# Patient Record
Sex: MALE | URBAN - METROPOLITAN AREA
[De-identification: names, ages, dates, MRNs, and addresses within clinical notes are randomized per-mention and may not be internally consistent; named-entity substitution may affect disease eponyms.]

---

## 2021-10-15 ENCOUNTER — APPOINTMENT (OUTPATIENT)
Dept: URBAN - METROPOLITAN AREA CLINIC 259 | Age: 31
Setting detail: DERMATOLOGY
End: 2021-10-19

## 2021-10-15 VITALS — HEIGHT: 73 IN | WEIGHT: 210 LBS

## 2021-10-15 DIAGNOSIS — L29.8 OTHER PRURITUS: ICD-10-CM

## 2021-10-15 DIAGNOSIS — R21 RASH AND OTHER NONSPECIFIC SKIN ERUPTION: ICD-10-CM

## 2021-10-15 PROCEDURE — 99202 OFFICE O/P NEW SF 15 MIN: CPT | Mod: 25

## 2021-10-15 PROCEDURE — 11102 TANGNTL BX SKIN SINGLE LES: CPT

## 2021-10-15 PROCEDURE — OTHER MIPS QUALITY: OTHER

## 2021-10-15 PROCEDURE — OTHER BIOPSY BY SHAVE METHOD: OTHER

## 2021-10-15 PROCEDURE — OTHER COUNSELING: OTHER

## 2021-10-15 ASSESSMENT — LOCATION DETAILED DESCRIPTION DERM
LOCATION DETAILED: LEFT SUPERIOR MEDIAL UPPER BACK
LOCATION DETAILED: STERNUM
LOCATION DETAILED: RIGHT MEDIAL UPPER BACK
LOCATION DETAILED: RIGHT MID-UPPER BACK

## 2021-10-15 ASSESSMENT — LOCATION SIMPLE DESCRIPTION DERM
LOCATION SIMPLE: CHEST
LOCATION SIMPLE: LEFT UPPER BACK
LOCATION SIMPLE: RIGHT UPPER BACK

## 2021-10-15 ASSESSMENT — LOCATION ZONE DERM: LOCATION ZONE: TRUNK

## 2021-10-15 NOTE — PROCEDURE: BIOPSY BY SHAVE METHOD
Hide Second Anesthesia?: No
Silver Nitrate Text: The wound bed was treated with silver nitrate after the biopsy was performed.
Biopsy Type: H and E
Type Of Destruction Used: Curettage
Information: Selecting Yes will display possible errors in your note based on the variables you have selected. This validation is only offered as a suggestion for you. PLEASE NOTE THAT THE VALIDATION TEXT WILL BE REMOVED WHEN YOU FINALIZE YOUR NOTE. IF YOU WANT TO FAX A PRELIMINARY NOTE YOU WILL NEED TO TOGGLE THIS TO 'NO' IF YOU DO NOT WANT IT IN YOUR FAXED NOTE.
Dressing: bandage
Cryotherapy Text: The wound bed was treated with cryotherapy after the biopsy was performed.
Was A Bandage Applied: Yes
Consent: Patient consent was obtained and risks were reviewed including but not limited to scarring, infection, bleeding, scabbing, incomplete removal, nerve damage and allergy to anesthesia.
Anesthesia Type: 1% lidocaine with epinephrine
X Size Of Lesion In Cm: 0
Electrodesiccation And Curettage Text: The wound bed was treated with electrodesiccation and curettage after the biopsy was performed.
Detail Level: Simple
Curettage Text: The wound bed was treated with curettage after the biopsy was performed.
Wound Care: Petrolatum
Depth Of Biopsy: dermis
Billing Type: Third-Party Bill
Hemostasis: Drysol
Electrodesiccation Text: The wound bed was treated with electrodesiccation after the biopsy was performed.
Notification Instructions: Patient will be notified of biopsy results. However, patient instructed to call the office if not contacted within 2 weeks.
Anesthesia Volume In Cc (Will Not Render If 0): 0.5
Biopsy Method: Dermablade
Post-Care Instructions: I reviewed with the patient in detail post-care instructions. Patient is to keep the biopsy site dry overnight, and then apply bacitracin twice daily until healed. Patient may apply hydrogen peroxide soaks to remove any crusting.

## 2021-10-15 NOTE — PROCEDURE: COUNSELING
Detail Level: Generalized
Detail Level: Zone
Patient Specific Counseling (Will Not Stick From Patient To Patient): Briefly discussed both Sidney’s and folliculitis. Patient was given informational handout for Sidney’s.

## 2021-10-15 NOTE — PROCEDURE: MIPS QUALITY
Quality 110: Preventive Care And Screening: Influenza Immunization: Influenza Immunization Ordered or Recommended, but not Administered due to system reason
Quality 130: Documentation Of Current Medications In The Medical Record: Current Medications Documented
Detail Level: Detailed
Quality 431: Preventive Care And Screening: Unhealthy Alcohol Use - Screening: Patient not identified as an unhealthy alcohol user when screened for unhealthy alcohol use using a systematic screening method
Quality 226: Preventive Care And Screening: Tobacco Use: Screening And Cessation Intervention: Patient screened for tobacco use and is an ex/non-smoker

## 2021-10-15 NOTE — HPI: RASH
What Type Of Note Output Would You Prefer (Optional)?: Standard Output
Is The Patient Presenting As Previously Scheduled?: Yes
Is This A New Presentation, Or A Follow-Up?: Rash
Additional History: Patient says he has had this rash intermittently for a few years. Rash is worse with stress, sweat, and in the winter. He says another dermatologist diagnosed the rash as eczema. Topical steroids have been helpful but the rash has never healed.

## 2021-11-01 ENCOUNTER — RX ONLY (RX ONLY)
Age: 31
End: 2021-11-01

## 2021-11-01 RX ORDER — CEFUROXIME AXETIL 250 MG/1
TABLET ORAL
Qty: 60 | Refills: 0 | Status: ERX | COMMUNITY
Start: 2021-11-01

## 2022-02-10 ENCOUNTER — APPOINTMENT (OUTPATIENT)
Dept: URBAN - METROPOLITAN AREA CLINIC 259 | Age: 32
Setting detail: DERMATOLOGY
End: 2022-02-15

## 2022-02-10 DIAGNOSIS — L29.8 OTHER PRURITUS: ICD-10-CM

## 2022-02-10 DIAGNOSIS — L663 OTHER SPECIFIED DISEASES OF HAIR AND HAIR FOLLICLES: ICD-10-CM

## 2022-02-10 DIAGNOSIS — L738 OTHER SPECIFIED DISEASES OF HAIR AND HAIR FOLLICLES: ICD-10-CM

## 2022-02-10 PROBLEM — L02.222 FURUNCLE OF BACK [ANY PART, EXCEPT BUTTOCK]: Status: ACTIVE | Noted: 2022-02-10

## 2022-02-10 PROBLEM — L02.02 FURUNCLE OF FACE: Status: ACTIVE | Noted: 2022-02-10

## 2022-02-10 PROCEDURE — OTHER MIPS QUALITY: OTHER

## 2022-02-10 PROCEDURE — OTHER COUNSELING: OTHER

## 2022-02-10 PROCEDURE — 99213 OFFICE O/P EST LOW 20 MIN: CPT

## 2022-02-10 PROCEDURE — OTHER PRESCRIPTION: OTHER

## 2022-02-10 RX ORDER — TRIAMCINOLONE ACETONIDE 1 MG/G
CREAM TOPICAL
Qty: 454 | Refills: 3 | Status: ERX | COMMUNITY
Start: 2022-02-10

## 2022-02-10 RX ORDER — KETOCONAZOLE 20 MG/ML
SHAMPOO, SUSPENSION TOPICAL
Qty: 1 | Refills: 4 | Status: ERX | COMMUNITY
Start: 2022-02-10

## 2022-02-10 RX ORDER — DOXYCYCLINE HYCLATE 50 MG/1
CAPSULE, GELATIN COATED ORAL
Qty: 30 | Refills: 4 | Status: ERX | COMMUNITY
Start: 2022-02-10

## 2022-02-10 ASSESSMENT — LOCATION DETAILED DESCRIPTION DERM
LOCATION DETAILED: SUPERIOR THORACIC SPINE
LOCATION DETAILED: LEFT INFERIOR CENTRAL MALAR CHEEK
LOCATION DETAILED: LEFT SUPERIOR MEDIAL UPPER BACK

## 2022-02-10 ASSESSMENT — LOCATION ZONE DERM
LOCATION ZONE: FACE
LOCATION ZONE: TRUNK

## 2022-02-10 ASSESSMENT — LOCATION SIMPLE DESCRIPTION DERM
LOCATION SIMPLE: LEFT CHEEK
LOCATION SIMPLE: LEFT UPPER BACK
LOCATION SIMPLE: UPPER BACK

## 2022-12-29 ENCOUNTER — APPOINTMENT (OUTPATIENT)
Dept: URBAN - METROPOLITAN AREA CLINIC 259 | Age: 32
Setting detail: DERMATOLOGY
End: 2023-01-01

## 2022-12-29 DIAGNOSIS — L738 OTHER SPECIFIED DISEASES OF HAIR AND HAIR FOLLICLES: ICD-10-CM

## 2022-12-29 DIAGNOSIS — L663 OTHER SPECIFIED DISEASES OF HAIR AND HAIR FOLLICLES: ICD-10-CM

## 2022-12-29 DIAGNOSIS — L20.89 OTHER ATOPIC DERMATITIS: ICD-10-CM

## 2022-12-29 PROBLEM — L20.84 INTRINSIC (ALLERGIC) ECZEMA: Status: ACTIVE | Noted: 2022-12-29

## 2022-12-29 PROBLEM — L02.222 FURUNCLE OF BACK [ANY PART, EXCEPT BUTTOCK]: Status: ACTIVE | Noted: 2022-12-29

## 2022-12-29 PROCEDURE — OTHER PRESCRIPTION: OTHER

## 2022-12-29 PROCEDURE — OTHER PRESCRIPTION MEDICATION MANAGEMENT: OTHER

## 2022-12-29 PROCEDURE — OTHER MIPS QUALITY: OTHER

## 2022-12-29 PROCEDURE — OTHER COUNSELING: OTHER

## 2022-12-29 PROCEDURE — 99213 OFFICE O/P EST LOW 20 MIN: CPT

## 2022-12-29 RX ORDER — ERYTHROMYCIN 20 MG/G
GEL TOPICAL
Qty: 60 | Refills: 2 | Status: ERX | COMMUNITY
Start: 2022-12-29

## 2022-12-29 RX ORDER — SULFAMETHOXAZOLE AND TRIMETHOPRIM 800; 160 MG/1; MG/1
TABLET ORAL
Qty: 120 | Refills: 0 | Status: ERX | COMMUNITY
Start: 2022-12-29

## 2022-12-29 RX ORDER — SODIUM SULFACETAMIDE AND SULFUR 80; 40 MG/ML; MG/ML
LOTION TOPICAL
Qty: 473 | Refills: 3 | Status: ERX | COMMUNITY
Start: 2022-12-29

## 2022-12-29 RX ORDER — TRIAMCINOLONE ACETONIDE 1 MG/G
CREAM TOPICAL BID
Qty: 454 | Refills: 3 | Status: ERX

## 2022-12-29 ASSESSMENT — LOCATION DETAILED DESCRIPTION DERM
LOCATION DETAILED: RIGHT MEDIAL UPPER BACK
LOCATION DETAILED: INFERIOR THORACIC SPINE

## 2022-12-29 ASSESSMENT — LOCATION SIMPLE DESCRIPTION DERM
LOCATION SIMPLE: RIGHT UPPER BACK
LOCATION SIMPLE: UPPER BACK

## 2022-12-29 ASSESSMENT — LOCATION ZONE DERM: LOCATION ZONE: TRUNK

## 2022-12-29 NOTE — PROCEDURE: PRESCRIPTION MEDICATION MANAGEMENT
Render In Strict Bullet Format?: No
Detail Level: Zone
Initiate Treatment: Bactrim DS BID\\nErythromycin gel QD\\nSulfacleanse QD
Plan: Discussed accutane with the Pt. The pt would like to try all the alternative options before initiating it. He will RTC in 2 months to reevaluate.

## 2022-12-30 RX ORDER — SODIUM SULFACETAMIDE AND SULFUR 80; 40 MG/ML; MG/ML
LOTION TOPICAL
Qty: 473 | Refills: 3 | Status: ERX

## 2023-01-11 ENCOUNTER — APPOINTMENT (OUTPATIENT)
Dept: CT IMAGING | Facility: CLINIC | Age: 33
End: 2023-01-11
Attending: EMERGENCY MEDICINE
Payer: COMMERCIAL

## 2023-01-11 ENCOUNTER — HOSPITAL ENCOUNTER (INPATIENT)
Facility: CLINIC | Age: 33
LOS: 2 days | Discharge: HOME | End: 2023-01-13
Attending: EMERGENCY MEDICINE | Admitting: INTERNAL MEDICINE
Payer: COMMERCIAL

## 2023-01-11 DIAGNOSIS — L30.9 DERMATITIS: Primary | ICD-10-CM

## 2023-01-11 DIAGNOSIS — K56.609 SMALL BOWEL OBSTRUCTION (H): ICD-10-CM

## 2023-01-11 LAB
ALBUMIN SERPL-MCNC: 4.5 G/DL (ref 3.4–5)
ALBUMIN UR-MCNC: NEGATIVE MG/DL
ALP SERPL-CCNC: 52 U/L (ref 40–150)
ALT SERPL W P-5'-P-CCNC: 47 U/L (ref 0–70)
ANION GAP SERPL CALCULATED.3IONS-SCNC: 10 MMOL/L (ref 3–14)
APPEARANCE UR: CLEAR
AST SERPL W P-5'-P-CCNC: 24 U/L (ref 0–45)
BASOPHILS # BLD AUTO: 0 10E3/UL (ref 0–0.2)
BASOPHILS NFR BLD AUTO: 0 %
BILIRUB SERPL-MCNC: 1.4 MG/DL (ref 0.2–1.3)
BILIRUB UR QL STRIP: NEGATIVE
BUN SERPL-MCNC: 17 MG/DL (ref 7–30)
CALCIUM SERPL-MCNC: 9.8 MG/DL (ref 8.5–10.1)
CHLORIDE BLD-SCNC: 101 MMOL/L (ref 94–109)
CO2 SERPL-SCNC: 24 MMOL/L (ref 20–32)
COLOR UR AUTO: ABNORMAL
CREAT SERPL-MCNC: 1.21 MG/DL (ref 0.66–1.25)
EOSINOPHIL # BLD AUTO: 0.1 10E3/UL (ref 0–0.7)
EOSINOPHIL NFR BLD AUTO: 1 %
ERYTHROCYTE [DISTWIDTH] IN BLOOD BY AUTOMATED COUNT: 11.8 % (ref 10–15)
GFR SERPL CREATININE-BSD FRML MDRD: 81 ML/MIN/1.73M2
GLUCOSE BLD-MCNC: 145 MG/DL (ref 70–99)
GLUCOSE UR STRIP-MCNC: NEGATIVE MG/DL
HCT VFR BLD AUTO: 44.8 % (ref 40–53)
HGB BLD-MCNC: 15.1 G/DL (ref 13.3–17.7)
HGB UR QL STRIP: NEGATIVE
IMM GRANULOCYTES # BLD: 0 10E3/UL
IMM GRANULOCYTES NFR BLD: 0 %
KETONES UR STRIP-MCNC: 40 MG/DL
LEUKOCYTE ESTERASE UR QL STRIP: NEGATIVE
LIPASE SERPL-CCNC: 168 U/L (ref 73–393)
LYMPHOCYTES # BLD AUTO: 1.5 10E3/UL (ref 0.8–5.3)
LYMPHOCYTES NFR BLD AUTO: 14 %
MCH RBC QN AUTO: 31.7 PG (ref 26.5–33)
MCHC RBC AUTO-ENTMCNC: 33.7 G/DL (ref 31.5–36.5)
MCV RBC AUTO: 94 FL (ref 78–100)
MONOCYTES # BLD AUTO: 0.2 10E3/UL (ref 0–1.3)
MONOCYTES NFR BLD AUTO: 2 %
MUCOUS THREADS #/AREA URNS LPF: PRESENT /LPF
NEUTROPHILS # BLD AUTO: 8.4 10E3/UL (ref 1.6–8.3)
NEUTROPHILS NFR BLD AUTO: 83 %
NITRATE UR QL: NEGATIVE
NRBC # BLD AUTO: 0 10E3/UL
NRBC BLD AUTO-RTO: 0 /100
PH UR STRIP: 6 [PH] (ref 5–7)
PLATELET # BLD AUTO: 220 10E3/UL (ref 150–450)
POTASSIUM BLD-SCNC: 4.7 MMOL/L (ref 3.4–5.3)
PROT SERPL-MCNC: 8 G/DL (ref 6.8–8.8)
RBC # BLD AUTO: 4.77 10E6/UL (ref 4.4–5.9)
RBC URINE: <1 /HPF
SODIUM SERPL-SCNC: 135 MMOL/L (ref 133–144)
SP GR UR STRIP: 1.03 (ref 1–1.03)
UROBILINOGEN UR STRIP-MCNC: NORMAL MG/DL
WBC # BLD AUTO: 10.2 10E3/UL (ref 4–11)
WBC URINE: <1 /HPF

## 2023-01-11 PROCEDURE — 99222 1ST HOSP IP/OBS MODERATE 55: CPT | Mod: AI | Performed by: INTERNAL MEDICINE

## 2023-01-11 PROCEDURE — 258N000003 HC RX IP 258 OP 636: Performed by: EMERGENCY MEDICINE

## 2023-01-11 PROCEDURE — 250N000011 HC RX IP 250 OP 636: Performed by: EMERGENCY MEDICINE

## 2023-01-11 PROCEDURE — 96375 TX/PRO/DX INJ NEW DRUG ADDON: CPT

## 2023-01-11 PROCEDURE — 120N000001 HC R&B MED SURG/OB

## 2023-01-11 PROCEDURE — 99222 1ST HOSP IP/OBS MODERATE 55: CPT | Performed by: SURGERY

## 2023-01-11 PROCEDURE — 96376 TX/PRO/DX INJ SAME DRUG ADON: CPT

## 2023-01-11 PROCEDURE — 96361 HYDRATE IV INFUSION ADD-ON: CPT

## 2023-01-11 PROCEDURE — 81001 URINALYSIS AUTO W/SCOPE: CPT | Performed by: EMERGENCY MEDICINE

## 2023-01-11 PROCEDURE — 36415 COLL VENOUS BLD VENIPUNCTURE: CPT | Performed by: EMERGENCY MEDICINE

## 2023-01-11 PROCEDURE — 80053 COMPREHEN METABOLIC PANEL: CPT | Performed by: EMERGENCY MEDICINE

## 2023-01-11 PROCEDURE — 74177 CT ABD & PELVIS W/CONTRAST: CPT

## 2023-01-11 PROCEDURE — 83690 ASSAY OF LIPASE: CPT | Performed by: EMERGENCY MEDICINE

## 2023-01-11 PROCEDURE — 250N000013 HC RX MED GY IP 250 OP 250 PS 637: Performed by: EMERGENCY MEDICINE

## 2023-01-11 PROCEDURE — 96374 THER/PROPH/DIAG INJ IV PUSH: CPT

## 2023-01-11 PROCEDURE — 85025 COMPLETE CBC W/AUTO DIFF WBC: CPT | Performed by: EMERGENCY MEDICINE

## 2023-01-11 PROCEDURE — 250N000009 HC RX 250: Performed by: EMERGENCY MEDICINE

## 2023-01-11 PROCEDURE — 258N000003 HC RX IP 258 OP 636: Performed by: INTERNAL MEDICINE

## 2023-01-11 PROCEDURE — 99285 EMERGENCY DEPT VISIT HI MDM: CPT | Mod: 25

## 2023-01-11 RX ORDER — IOPAMIDOL 755 MG/ML
101 INJECTION, SOLUTION INTRAVASCULAR ONCE
Status: COMPLETED | OUTPATIENT
Start: 2023-01-11 | End: 2023-01-11

## 2023-01-11 RX ORDER — SULFAMETHOXAZOLE/TRIMETHOPRIM 800-160 MG
1 TABLET ORAL 2 TIMES DAILY WITH MEALS
Status: ON HOLD | COMMUNITY
Start: 2022-12-29 | End: 2023-01-13

## 2023-01-11 RX ORDER — MORPHINE SULFATE 4 MG/ML
4 INJECTION, SOLUTION INTRAMUSCULAR; INTRAVENOUS ONCE
Status: COMPLETED | OUTPATIENT
Start: 2023-01-11 | End: 2023-01-11

## 2023-01-11 RX ORDER — ACETAMINOPHEN 325 MG/1
650 TABLET ORAL EVERY 6 HOURS PRN
Status: DISCONTINUED | OUTPATIENT
Start: 2023-01-11 | End: 2023-01-13 | Stop reason: HOSPADM

## 2023-01-11 RX ORDER — ERYTHROMYCIN 20 MG/G
1 GEL TOPICAL 2 TIMES DAILY
COMMUNITY
Start: 2022-12-29 | End: 2023-01-19

## 2023-01-11 RX ORDER — ACETAMINOPHEN 650 MG/1
650 SUPPOSITORY RECTAL EVERY 6 HOURS PRN
Status: DISCONTINUED | OUTPATIENT
Start: 2023-01-11 | End: 2023-01-13 | Stop reason: HOSPADM

## 2023-01-11 RX ORDER — SODIUM CHLORIDE 9 MG/ML
INJECTION, SOLUTION INTRAVENOUS CONTINUOUS
Status: DISCONTINUED | OUTPATIENT
Start: 2023-01-11 | End: 2023-01-11

## 2023-01-11 RX ORDER — TRIAMCINOLONE ACETONIDE 1 MG/G
CREAM TOPICAL
COMMUNITY
Start: 2022-12-29 | End: 2023-01-19

## 2023-01-11 RX ORDER — ONDANSETRON 2 MG/ML
4 INJECTION INTRAMUSCULAR; INTRAVENOUS EVERY 30 MIN PRN
Status: DISCONTINUED | OUTPATIENT
Start: 2023-01-11 | End: 2023-01-11

## 2023-01-11 RX ORDER — ONDANSETRON 2 MG/ML
4 INJECTION INTRAMUSCULAR; INTRAVENOUS EVERY 6 HOURS PRN
Status: DISCONTINUED | OUTPATIENT
Start: 2023-01-11 | End: 2023-01-13 | Stop reason: HOSPADM

## 2023-01-11 RX ORDER — DIPHENHYDRAMINE HYDROCHLORIDE 50 MG/ML
25 INJECTION INTRAMUSCULAR; INTRAVENOUS ONCE
Status: COMPLETED | OUTPATIENT
Start: 2023-01-11 | End: 2023-01-11

## 2023-01-11 RX ORDER — PROCHLORPERAZINE MALEATE 5 MG
10 TABLET ORAL EVERY 6 HOURS PRN
Status: DISCONTINUED | OUTPATIENT
Start: 2023-01-11 | End: 2023-01-13 | Stop reason: HOSPADM

## 2023-01-11 RX ORDER — ONDANSETRON 4 MG/1
4 TABLET, ORALLY DISINTEGRATING ORAL EVERY 6 HOURS PRN
Status: DISCONTINUED | OUTPATIENT
Start: 2023-01-11 | End: 2023-01-13 | Stop reason: HOSPADM

## 2023-01-11 RX ORDER — ERYTHROMYCIN 20 MG/G
1 GEL TOPICAL 2 TIMES DAILY
Status: DISCONTINUED | OUTPATIENT
Start: 2023-01-11 | End: 2023-01-13 | Stop reason: HOSPADM

## 2023-01-11 RX ORDER — POLYETHYLENE GLYCOL 3350 17 G/17G
17 POWDER, FOR SOLUTION ORAL DAILY PRN
Status: DISCONTINUED | OUTPATIENT
Start: 2023-01-11 | End: 2023-01-13 | Stop reason: HOSPADM

## 2023-01-11 RX ORDER — PROCHLORPERAZINE 25 MG
25 SUPPOSITORY, RECTAL RECTAL EVERY 12 HOURS PRN
Status: DISCONTINUED | OUTPATIENT
Start: 2023-01-11 | End: 2023-01-13 | Stop reason: HOSPADM

## 2023-01-11 RX ORDER — LIDOCAINE 40 MG/G
CREAM TOPICAL
Status: DISCONTINUED | OUTPATIENT
Start: 2023-01-11 | End: 2023-01-13 | Stop reason: HOSPADM

## 2023-01-11 RX ORDER — SODIUM CHLORIDE, SODIUM LACTATE, POTASSIUM CHLORIDE, CALCIUM CHLORIDE 600; 310; 30; 20 MG/100ML; MG/100ML; MG/100ML; MG/100ML
INJECTION, SOLUTION INTRAVENOUS CONTINUOUS
Status: DISCONTINUED | OUTPATIENT
Start: 2023-01-11 | End: 2023-01-13 | Stop reason: HOSPADM

## 2023-01-11 RX ORDER — BISACODYL 10 MG
10 SUPPOSITORY, RECTAL RECTAL DAILY PRN
Status: DISCONTINUED | OUTPATIENT
Start: 2023-01-11 | End: 2023-01-13 | Stop reason: HOSPADM

## 2023-01-11 RX ORDER — METOCLOPRAMIDE HYDROCHLORIDE 5 MG/ML
10 INJECTION INTRAMUSCULAR; INTRAVENOUS ONCE
Status: COMPLETED | OUTPATIENT
Start: 2023-01-11 | End: 2023-01-11

## 2023-01-11 RX ADMIN — MORPHINE SULFATE 4 MG: 4 INJECTION, SOLUTION INTRAMUSCULAR; INTRAVENOUS at 08:23

## 2023-01-11 RX ADMIN — SODIUM CHLORIDE, POTASSIUM CHLORIDE, SODIUM LACTATE AND CALCIUM CHLORIDE: 600; 310; 30; 20 INJECTION, SOLUTION INTRAVENOUS at 14:12

## 2023-01-11 RX ADMIN — SODIUM CHLORIDE 1000 ML: 9 INJECTION, SOLUTION INTRAVENOUS at 08:28

## 2023-01-11 RX ADMIN — SODIUM CHLORIDE 1000 ML: 9 INJECTION, SOLUTION INTRAVENOUS at 05:45

## 2023-01-11 RX ADMIN — METOCLOPRAMIDE 10 MG: 5 INJECTION, SOLUTION INTRAMUSCULAR; INTRAVENOUS at 06:26

## 2023-01-11 RX ADMIN — ONDANSETRON 4 MG: 2 INJECTION INTRAMUSCULAR; INTRAVENOUS at 08:23

## 2023-01-11 RX ADMIN — DIPHENHYDRAMINE HYDROCHLORIDE 25 MG: 50 INJECTION, SOLUTION INTRAMUSCULAR; INTRAVENOUS at 06:26

## 2023-01-11 RX ADMIN — IOPAMIDOL 101 ML: 755 INJECTION, SOLUTION INTRAVENOUS at 06:52

## 2023-01-11 RX ADMIN — ALUMINUM HYDROXIDE, MAGNESIUM HYDROXIDE, AND SIMETHICONE 30 ML: 200; 200; 20 SUSPENSION ORAL at 06:26

## 2023-01-11 RX ADMIN — SODIUM CHLORIDE 69 ML: 900 INJECTION INTRAVENOUS at 06:52

## 2023-01-11 RX ADMIN — FAMOTIDINE 20 MG: 10 INJECTION INTRAVENOUS at 06:25

## 2023-01-11 RX ADMIN — ONDANSETRON 4 MG: 2 INJECTION INTRAMUSCULAR; INTRAVENOUS at 05:42

## 2023-01-11 ASSESSMENT — ACTIVITIES OF DAILY LIVING (ADL)
ADLS_ACUITY_SCORE: 35

## 2023-01-11 ASSESSMENT — ENCOUNTER SYMPTOMS
DYSURIA: 0
HEMATURIA: 0
BACK PAIN: 0
FEVER: 0
COUGH: 0
VOMITING: 1
NAUSEA: 1
FLANK PAIN: 0
DIARRHEA: 0
CONSTIPATION: 0
ABDOMINAL PAIN: 1

## 2023-01-11 NOTE — ED TRIAGE NOTES
9pm last night began to have middle abdominal pain with vomiting     Triage Assessment     Row Name 01/11/23 0527       Triage Assessment (Adult)    Airway WDL WDL       Respiratory WDL    Respiratory WDL WDL       Skin Circulation/Temperature WDL    Skin Circulation/Temperature WDL WDL       Cardiac WDL    Cardiac WDL WDL       Peripheral/Neurovascular WDL    Peripheral Neurovascular WDL WDL       Cognitive/Neuro/Behavioral WDL    Cognitive/Neuro/Behavioral WDL WDL

## 2023-01-11 NOTE — ED TRIAGE NOTES
Triage Assessment     Row Name 01/11/23 0527       Triage Assessment (Adult)    Airway WDL WDL       Respiratory WDL    Respiratory WDL WDL       Skin Circulation/Temperature WDL    Skin Circulation/Temperature WDL WDL       Cardiac WDL    Cardiac WDL WDL       Peripheral/Neurovascular WDL    Peripheral Neurovascular WDL WDL       Cognitive/Neuro/Behavioral WDL    Cognitive/Neuro/Behavioral WDL WDL

## 2023-01-11 NOTE — H&P
"[facility]    History and Physical - Hospitalist Service       Date of Admission:  1/11/2023    Assessment & Plan   Montrell Jaramillo is a 32 year-old male with no significant past medical history who presents with nausea, vomiting and abdominal pain. Admitted on 1/11/2023.    Small bowel obstruction  *Presented with nausea, vomiting, abdominal pain.   *CT abd/pelvis with multiple dilated mid to distal small bowel loops leading to decompression at RLQ, compatible with SBO.   *No prior history of abdominal surgeries in the past. Abdominal exam non-surgical at this time.   - NPO  - NG deferred for now as not actively vomiting, place if recurrent vomiting. Discussed with patient  - IV fluids  - Monitor and replace electrolytes  - General surgery consulted for co-management   - IV analgesics, anti-emetics    Cystic acne, back  *Recently prescribed 1 month of TMP-SMX, erythromycin gel, triamcinolone by dermatology.  *Reports significantly improved and had some stomach upset with TMP-SMX  - Hold TMP-SMX  - Continue erythromycin gel, can use home supply        Diet:   NPO  DVT Prophylaxis: Pneumatic Compression Devices  Morris Catheter: Not present  Code Status:   Full code    Disposition Plan   Admit to inpatient. Anticipate greater than or equal to 2 midnights prior to discharge.     Entered: Jameel Marcum MD 01/11/2023, 8:19 AM     The patient's care was discussed with the ED provider, patient       Clinically Significant Risk Factors Present on Admission                       # Overweight: Estimated body mass index is 26.39 kg/m  as calculated from the following:    Height as of this encounter: 1.854 m (6' 1\").    Weight as of this encounter: 90.7 kg (200 lb).                Jameel Marcum MD  [facility]    ______________________________________________________________________    Chief Complaint   Nausea, vomiting, abdominal pain     History of Present Illness "   Montrell Jaramillo is a 32 year old male who presents with the above chief complaint.    History is obtained from the patient. The patient reports around 9 PM the night prior to admission he developed sharp, cramping abdominal pain that occurred as a band across his mid to lower abdomen. He felt nauseated and had multiple episodes of vomiting, last occurring at around 4:30 AM this morning.  He last passed gas and had a bowel movement yesterday afternoon before symptom onset.  He reports while he was in college he had a similar episode with negative work-up.  He denies any prior abdominal surgeries.  He was recently prescribed TMP-SMX, steroid cream and abx cream for cystic acne/rash on his back. He reports some stomach upset with TMP-SMX and overall his condition has substantially improved. He denies any abscess or anything requiring drainage. Otherwise denies any chronic medical issues.    In the Emergency Department, the patient was seen by Dr. Wilson, with whom I discussed the patient's presenting symptoms and emergency department course.  Initial vital signs were a temperature of 98.5F, HR 89, /72, RR 20, SpO2 99% on room air. Pertinent work-up as noted in A&P. The patient received IV fluids, anti-emetics and Hospitalists were contacted for admission to the hospital.     Review of Systems    Complete 10 point review of systems assessed and negative except as noted in HPI.    Past Medical History    I have reviewed this patient's medical history and updated it with pertinent information if needed.   No past medical history on file.    Past Surgical History   I have reviewed this patient's surgical history and updated it with pertinent information if needed.  No past surgical history on file.      Social History   I have reviewed this patient's social history and updated it with pertinent information if needed.  Non smoker. Reports alcohol use on the weekends. No illicit or IV drug use    Lives in  [place]    Family History   I have reviewed this patient's family history and updated it with pertinent information if needed.   Family History   Problem Relation Age of Onset     Heart Disease Father         Prior to Admission Medications   Prior to Admission Medications   Prescriptions Last Dose Informant Patient Reported? Taking?   erythromycin with ethanol (EMGEL) 2 % gel 1/10/2023  Yes Yes   Sig: Apply 1 applicator topically 2 times daily Affected area on back   sulfamethoxazole-trimethoprim (BACTRIM DS) 800-160 MG tablet 1/10/2023  Yes Yes   Sig: Take 1 tablet by mouth 2 times daily (with meals) Prescribed 12/29/22 for 30 days   triamcinolone (KENALOG) 0.1 % external cream 1/10/2023  Yes Yes   Sig: APPLY TWICE DAILY TO THE AFFECTED AREA ON BACK FOR UP TO 2 WEEKS AT A TIME. TAKE A 2 WEEK BREAK AND REPEAT AS NEEDED      Facility-Administered Medications: None     Allergies   Allergies   Allergen Reactions     Penicillins Rash       Physical Exam   Vital Signs: Temp: 98.5  F (36.9  C)   BP: 127/72 (Simultaneous filing. User may not have seen previous data.) Pulse: 89 (Simultaneous filing. User may not have seen previous data.)   Resp: 20 SpO2: 99 % (Simultaneous filing. User may not have seen previous data.) O2 Device: None (Room air)    Weight: 200 lbs 0 oz    Constitutional: Well-appearing, NAD  Eyes: PERRL, EOMI  HENT: Oropharynx clear, MMM  Respiratory: Clear to auscultation bilaterally, good air movement, normal effort   Cardiovascular: RRR, no m/r/g. No peripheral edema.   GI: Soft, mild diffuse tenderness, non-distended. No rebound tenderness or guarding.   Skin: Warm, dry. Cystic acne on back.  Neurologic: Alert. Responding to questions appropriately. Following commands.    Psychiatric: Normal affect, appropriate      Data   Data reviewed today: I reviewed all medications, new labs and imaging results over the last 24 hours. I personally reviewed no images or EKG's today.    Recent Labs   Lab  01/11/23  0538   WBC 10.2   HGB 15.1   MCV 94         POTASSIUM 4.7   CHLORIDE 101   CO2 24   BUN 17   CR 1.21   ANIONGAP 10   LINDA 9.8   *   ALBUMIN 4.5   PROTTOTAL 8.0   BILITOTAL 1.4*   ALKPHOS 52   ALT 47   AST 24   LIPASE 168       Recent Results (from the past 24 hour(s))   CT Abdomen Pelvis w Contrast    Narrative    CT ABDOMEN/PELVIS WITH CONTRAST January 11, 2023 7:01 AM    CLINICAL HISTORY: Epigastric and left lower quadrant pain.    TECHNIQUE: CT scan of the abdomen and pelvis was performed following  injection of IV contrast. Multiplanar reformats were obtained. Dose  reduction techniques were used.  CONTRAST: 101mL Isovue-370.    COMPARISON: None.    FINDINGS:   LOWER CHEST: Normal.    HEPATOBILIARY: Normal.    PANCREAS: Normal.    SPLEEN: Normal.    ADRENAL GLANDS: Normal.    KIDNEYS/BLADDER: No significant mass, stones, or hydronephrosis. There  are simple or benign cysts. No follow up is needed.    BOWEL: Multiple dilated mid to distal small bowel loops leading to  fecalized small bowel at the pelvis level. Transition to decompression  at the distal ileum at the right lower quadrant. No convincing  evidence for appendicitis. Limited view of the appendix. No free air  at this time. Decompressed colon.    PELVIC ORGANS: Small pelvic fluid noted.    ADDITIONAL FINDINGS: None.    MUSCULOSKELETAL: Normal.      Impression    IMPRESSION: Multiple dilated mid to distal small bowel loops leading  to decompression at the right lower quadrant. Findings compatible with  small bowel obstruction. There is small free pelvic fluid. No free air  is seen.

## 2023-01-11 NOTE — CONSULTS
"[facility] General Surgery Consultation    Montrell Jaramillo MRN# [id number]   YOB: 1990 Age: 32 year old      Date of Admission:  1/11/2023  Date of Consult: 1/11/2023         Assessment and Plan:   Patient is a 32 year old male with no significant past medical history who presents with his second small bowel obstruction. He has no medical or surgical history suggestive of any underlying cause. His abdominal exam is reassuring against ischemia or perforation, currently without any signs of peritonitis - no indication for emergent intervention.     PLAN:  NPO  Would place NG if vomiting  We will discuss and consider possibility of diagnostic laparoscopy to investigate underlying cause, possibly tomorrow           Requesting Physician:      Mendy Wilson MD          Chief Complaint:     Chief Complaint   Patient presents with     Abdominal Pain     Vomiting          History of Present Illness:   Montrell Jaramillo is a 32 year old male who presented to the ED this morning with epigastric abdominal pain that was onset last evening around 9 PM. He reports the pain is severe and was associated with significant nausea and vomiting. He has not stooled or passed gas since yesterday, before the pain started. The pain is largely central. He came to the ED because vomiting did not relieve the pain.    Approximately 12 years ago when he was in college, he had a similar episode. Work-up was negative for underlying cause (barium swallow and CT). His pain at that time was primarily in his left flank.     He denies any personal or family history of inflammatory bowel disease. He has an uncle with colon cancer. He has never had a colonoscopy.           Physical Exam:   Blood pressure 127/72, pulse 89, temperature 98.5  F (36.9  C), resp. rate 20, height 1.854 m (6' 1\"), weight 90.7 kg (200 lb), SpO2 99 %.  200 lbs 0 oz  General: Curled up in bed, appears uncomfortably.   Psych: Alert and Oriented.  " Normal affect  Neurological: grossly intact  Eyes: Sclera clear  Respiratory:  nonlabored breathing  Cardiovascular:  RRR  GI: Abdomen soft, mildly distended. Tender to palpation especially in epigastric/periumbilical region. No surgical scars.    Lymphatic/Hematologic/Immune:  No femoral or cervical lymphadenopathy.  Integumentary:  No rashes         Past Medical History:   Cystic acne         Past Surgical History:   None         Current Medications:           ondansetron         Home Medications:     Prior to Admission medications    Medication Sig Last Dose Taking? Auth Provider Long Term End Date   erythromycin with ethanol (EMGEL) 2 % gel Apply 1 applicator topically 2 times daily Affected area on back 1/10/2023 Yes Unknown, Entered By History     sulfamethoxazole-trimethoprim (BACTRIM DS) 800-160 MG tablet Take 1 tablet by mouth 2 times daily (with meals) Prescribed 12/29/22 for 30 days 1/10/2023 Yes Unknown, Entered By History     triamcinolone (KENALOG) 0.1 % external cream APPLY TWICE DAILY TO THE AFFECTED AREA ON BACK FOR UP TO 2 WEEKS AT A TIME. TAKE A 2 WEEK BREAK AND REPEAT AS NEEDED 1/10/2023 Yes Unknown, Entered By History              Allergies:     Allergies   Allergen Reactions     Penicillins Rash            Family History:     Family History   Problem Relation Age of Onset     Heart Disease Father    Colon cancer  Uncle        Social History:   Montrell Jaramillo  reports that he has never smoked. He does not have any smokeless tobacco history on file. He reports current alcohol use.          Review of Systems:   The 10 point Review of Systems is negative other than noted in the HPI.         Labs/Imaging   All new lab and imaging data was reviewed.   CT A/P:  Multiple dilated mid to distal small bowel loops leading  to decompression at the right lower quadrant. Findings compatible with  small bowel obstruction. There is small free pelvic fluid. No free air  is seen.        Kendal Fernandes MD

## 2023-01-11 NOTE — ED PROVIDER NOTES
"  History     Chief Complaint:  Abdominal Pain and Vomiting     The history is provided by the patient.      Montrell Jaramillo is a healthy 32 year old male who presents with abdominal pain and vomiting. Last night, about 8.5 hours prior to arrival, he gradually developed epigastric abdominal pain described as stabbing and as if \"something is blocked\". He has had numerous episodes of non-bloody emesis. He had a normal bowel movement prior to onset of pain yesterday. He has no dysuria or hematuria citing decreased urine output he attributes to dehydration. He denies fever, cough, or flank pain. He has not tried any medications for pain. He denies overuse of marijuana or alcohol.    Notably, he had a similar episode of pain and vomiting in 2012 in [place] without definitive diagnosis.    Independent Historian: Yes, as noted above.      Review of External Notes: I reviewed the ED visit from 4/20/2012     ROS:  Review of Systems   Constitutional: Negative for fever.   Respiratory: Negative for cough.    Gastrointestinal: Positive for abdominal pain, nausea and vomiting. Negative for constipation and diarrhea.   Genitourinary: Positive for decreased urine volume. Negative for dysuria, flank pain and hematuria.   Musculoskeletal: Negative for back pain.   All other systems reviewed and are negative.    Allergies:  Penicillins     Medications:    Erythromycin gel  Bactrim (acne)  Kenalog cream    Past Medical History:    The patient denies past medical history.     Social History:  Patient is escorted by a friend.   He denies cigarette use but reports occasional marijuana and alcohol use.     Physical Exam     Patient Vitals for the past 24 hrs:   BP Temp Pulse Resp SpO2 Height Weight   01/11/23 0528 -- -- -- -- -- 1.854 m (6' 1\") 90.7 kg (200 lb)   01/11/23 0527 127/72 98.5  F (36.9  C) 89 20 99 % -- --        Physical Exam  General: Well-developed and well-nourished. Uncomfortable appearing young  man. " Cooperative.  Head:  Atraumatic.  Eyes:  Conjunctivae, lids, and sclerae are normal.  Neck:  Supple. Normal range of motion.  CV:  Regular rate and rhythm. Normal heart sounds with no murmurs, rubs, or gallops detected.  Resp:  No respiratory distress. Clear to auscultation bilaterally without decreased breath sounds, wheezing, rales, or rhonchi.  GI:  Soft. Non-distended.  Epigastric and left abdominal tenderness to palpation.   MS:  Normal ROM.   Skin:  Warm. Non-diaphoretic. No pallor.  Neuro:  Awake. A&Ox3. Normal strength.  Psych: Normal mood and affect. Normal speech.  Vitals reviewed.    Emergency Department Course     Imaging:  CT Abdomen Pelvis w Contrast   Preliminary Result   IMPRESSION: Multiple dilated mid to distal small bowel loops leading   to decompression at the right lower quadrant. Findings compatible with   small bowel obstruction. There is small free pelvic fluid. No free air   is seen.         Report per radiology    Laboratory:  Labs Ordered and Resulted from Time of ED Arrival to Time of ED Departure   COMPREHENSIVE METABOLIC PANEL - Abnormal       Result Value    Sodium 135      Potassium 4.7      Chloride 101      Carbon Dioxide (CO2) 24      Anion Gap 10      Urea Nitrogen 17      Creatinine 1.21      Calcium 9.8      Glucose 145 (*)     Alkaline Phosphatase 52      AST 24      ALT 47      Protein Total 8.0      Albumin 4.5      Bilirubin Total 1.4 (*)     GFR Estimate 81     CBC WITH PLATELETS AND DIFFERENTIAL - Abnormal    WBC Count 10.2      RBC Count 4.77      Hemoglobin 15.1      Hematocrit 44.8      MCV 94      MCH 31.7      MCHC 33.7      RDW 11.8      Platelet Count 220      % Neutrophils 83      % Lymphocytes 14      % Monocytes 2      % Eosinophils 1      % Basophils 0      % Immature Granulocytes 0      NRBCs per 100 WBC 0      Absolute Neutrophils 8.4 (*)     Absolute Lymphocytes 1.5      Absolute Monocytes 0.2      Absolute Eosinophils 0.1      Absolute Basophils 0.0       Absolute Immature Granulocytes 0.0      Absolute NRBCs 0.0     ROUTINE UA WITH MICROSCOPIC REFLEX TO CULTURE - Abnormal    Color Urine Light Yellow      Appearance Urine Clear      Glucose Urine Negative      Bilirubin Urine Negative      Ketones Urine 40 (*)     Specific Gravity Urine 1.032      Blood Urine Negative      pH Urine 6.0      Protein Albumin Urine Negative      Urobilinogen Urine Normal      Nitrite Urine Negative      Leukocyte Esterase Urine Negative      Mucus Urine Present (*)     RBC Urine <1      WBC Urine <1     LIPASE - Normal    Lipase 168        Emergency Department Course & Assessments:  Interventions:  Medications   0.9% sodium chloride BOLUS (1,000 mLs Intravenous Stopped 1/11/23 1250)   ondansetron (ZOFRAN) injection 4 mg (4 mg Intravenous Given 1/11/23 0823 and 0542)   lactated ringers infusion ( Intravenous New Bag 1/11/23 1412)   0.9% sodium chloride BOLUS (1,000 mLs Intravenous Stopped 1/11/23 1250)   metoclopramide (REGLAN) injection 10 mg (10 mg Intravenous Given 1/11/23 0626)   diphenhydrAMINE (BENADRYL) injection 25 mg (25 mg Intravenous Given 1/11/23 0626)   famotidine (PEPCID) injection 20 mg (20 mg Intravenous Given 1/11/23 0625)   lidocaine (viscous) (XYLOCAINE) 2 % 15 mL, alum & mag hydroxide-simethicone (MAALOX) 15 mL GI Cocktail (30 mLs Oral Given 1/11/23 0626)   morphine (PF) injection 4 mg (4 mg Intravenous Given 1/11/23 0823)      Independent Interpretation (X-rays, CTs, rhythm strip):  0734 I reviewed and agree with the above CT interpretation.      Consultations/Discussion of Management or Tests:  0611 I obtained history and examined the patient as noted above.  0810 I rechecked the patient and explained findings. His pain did improve and he was able to sleep for about an hour but upon waking, his pain is returning. He has has had no vomiting during visit. Patient understands plan for admission.   0819 I consulted with Dr. Marcum of the hospitalist team regarding the  patient. He accepted the patient for admission.     Social Determinants of Health affecting care:  Montrell has a supportive friend.     Disposition:  The patient was admitted to the hospital under the care of Dr. Marcum.     Impression & Plan    Medical Decision Making:  Montrell is a 32-year-old man who developed abdominal pain and vomiting last night.  He describes pain as primarily epigastric.  On exam he appears uncomfortable.  He has tenderness in the epigastrium but also along the left abdomen.  Given this more diffuse tenderness he was sent for CT of the abdomen and pelvis.  This does reveal evidence of a small bowel obstruction.  Interestingly, he has a virgin abdomen.  He had an episode that was similar in 2012 and initial non-contrast CT was concerning for a dilated loop of small bowel although repeat contrast-enhanced CT was normal.  Today his laboratory studies are unremarkable.  He was given 2 L of IV fluids.  He had no vomiting after Zofran, Reglan, and Benadryl.  He was given Pepcid, GI cocktail, and morphine for pain.  NG tube was deferred as his vomiting resolved.  I updated him on findings as well as plan for admission and answered all his questions.  He verbalized understanding and is amenable.  I discussed the patient's case with Dr. Marcum, hospitalist, who accepts admission and has no further orders.    Diagnosis:    ICD-10-CM    1. Small bowel obstruction (H)  K56.609           Scribe Disclosure:  I, Tamra Wiggins, am serving as a scribe at 6:11 AM on 1/11/2023 to document services personally performed by Mendy Wilson MD based on my observations and the provider's statements to me.     1/11/2023   Menyd Wilson MD Dixson, Kylie S, MD  01/11/23 1941

## 2023-01-11 NOTE — ED NOTES
"[facility]  ED Nurse Handoff Report    ED Chief complaint: Abdominal Pain and Vomiting      ED Diagnosis:   Final diagnoses:   None       Code Status: Full Code    Allergies:   Allergies   Allergen Reactions     Penicillins Rash       Patient Story: Pt presents with complaints of mid abd pain starting last night at 2100. Pt has nausea and vomiting with pain.   Focused Assessment:  +Mid abd pain, Nausea, and vomiting    Treatments and/or interventions provided: IVF, morphine, and Zofran  Patient's response to treatments and/or interventions: Pain improved    To be done/followed up on inpatient unit:  Potential NG placement.     Does this patient have any cognitive concerns?: A&O x4    Activity level - Baseline/Home:  Independent  Activity Level - Current:   Independent    Patient's Preferred language: English   Needed?: No    Isolation: None  Infection: Not Applicable  Patient tested for COVID 19 prior to admission: NO  Bariatric?: No    Vital Signs:   Vitals:    01/11/23 0527 01/11/23 0528   BP: 127/72    Pulse: 89    Resp: 20    Temp: 98.5  F (36.9  C)    SpO2: 99%    Weight:  90.7 kg (200 lb)   Height:  1.854 m (6' 1\")       Cardiac Rhythm:     Was the PSS-3 completed:   Yes  What interventions are required if any?               Family Comments: S.O and Brother Available by phone  OBS brochure/video discussed/provided to patient/family: Yes              Name of person given brochure if not patient: NA              Relationship to patient: NA    For the majority of the shift this patient's behavior was Green.   Behavioral interventions performed were NA.    ED NURSE PHONE NUMBER: [phone]         "

## 2023-01-11 NOTE — PHARMACY-ADMISSION MEDICATION HISTORY
Pharmacy Medication History  Admission medication history interview status for the 1/11/2023  admission is complete. See EPIC admission navigator for prior to admission medications     Location of Interview: Patient room  Medication history sources: Patient and Surescripts    In the past week, patient estimated taking medication this percent of the time: greater than 90%    Medication reconciliation completed by provider prior to medication history? No    Time spent in this activity: 10 min    Prior to Admission medications    Medication Sig Last Dose Taking? Auth Provider Long Term End Date   erythromycin with ethanol (EMGEL) 2 % gel Apply 1 applicator topically 2 times daily Affected area on back 1/10/2023 Yes Unknown, Entered By History     sulfamethoxazole-trimethoprim (BACTRIM DS) 800-160 MG tablet Take 1 tablet by mouth 2 times daily (with meals) Prescribed 12/29/22 for 30 days 1/10/2023 Yes Unknown, Entered By History     triamcinolone (KENALOG) 0.1 % external cream APPLY TWICE DAILY TO THE AFFECTED AREA ON BACK FOR UP TO 2 WEEKS AT A TIME. TAKE A 2 WEEK BREAK AND REPEAT AS NEEDED 1/10/2023 Yes Unknown, Entered By History         The information provided in this note is only as accurate as the sources available at the time of update(s)

## 2023-01-12 ENCOUNTER — APPOINTMENT (OUTPATIENT)
Dept: GENERAL RADIOLOGY | Facility: CLINIC | Age: 33
End: 2023-01-12
Attending: STUDENT IN AN ORGANIZED HEALTH CARE EDUCATION/TRAINING PROGRAM
Payer: COMMERCIAL

## 2023-01-12 LAB
ANION GAP SERPL CALCULATED.3IONS-SCNC: 6 MMOL/L (ref 3–14)
BUN SERPL-MCNC: 13 MG/DL (ref 7–30)
CALCIUM SERPL-MCNC: 8.3 MG/DL (ref 8.5–10.1)
CHLORIDE BLD-SCNC: 105 MMOL/L (ref 94–109)
CO2 SERPL-SCNC: 26 MMOL/L (ref 20–32)
CREAT SERPL-MCNC: 1.03 MG/DL (ref 0.66–1.25)
ERYTHROCYTE [DISTWIDTH] IN BLOOD BY AUTOMATED COUNT: 12 % (ref 10–15)
GFR SERPL CREATININE-BSD FRML MDRD: >90 ML/MIN/1.73M2
GLUCOSE BLD-MCNC: 88 MG/DL (ref 70–99)
HCT VFR BLD AUTO: 37.7 % (ref 40–53)
HGB BLD-MCNC: 12.8 G/DL (ref 13.3–17.7)
MCH RBC QN AUTO: 32.1 PG (ref 26.5–33)
MCHC RBC AUTO-ENTMCNC: 34 G/DL (ref 31.5–36.5)
MCV RBC AUTO: 95 FL (ref 78–100)
PLATELET # BLD AUTO: 179 10E3/UL (ref 150–450)
POTASSIUM BLD-SCNC: 4 MMOL/L (ref 3.4–5.3)
RBC # BLD AUTO: 3.99 10E6/UL (ref 4.4–5.9)
SODIUM SERPL-SCNC: 137 MMOL/L (ref 133–144)
WBC # BLD AUTO: 6.8 10E3/UL (ref 4–11)

## 2023-01-12 PROCEDURE — 999N000065 XR ABDOMEN 1 VIEW

## 2023-01-12 PROCEDURE — 80048 BASIC METABOLIC PNL TOTAL CA: CPT | Performed by: INTERNAL MEDICINE

## 2023-01-12 PROCEDURE — 258N000003 HC RX IP 258 OP 636: Performed by: INTERNAL MEDICINE

## 2023-01-12 PROCEDURE — 120N000001 HC R&B MED SURG/OB

## 2023-01-12 PROCEDURE — G0378 HOSPITAL OBSERVATION PER HR: HCPCS

## 2023-01-12 PROCEDURE — 85027 COMPLETE CBC AUTOMATED: CPT | Performed by: INTERNAL MEDICINE

## 2023-01-12 PROCEDURE — 74018 RADEX ABDOMEN 1 VIEW: CPT

## 2023-01-12 PROCEDURE — 250N000013 HC RX MED GY IP 250 OP 250 PS 637: Performed by: INTERNAL MEDICINE

## 2023-01-12 PROCEDURE — 99232 SBSQ HOSP IP/OBS MODERATE 35: CPT | Performed by: INTERNAL MEDICINE

## 2023-01-12 PROCEDURE — 36415 COLL VENOUS BLD VENIPUNCTURE: CPT | Performed by: INTERNAL MEDICINE

## 2023-01-12 RX ORDER — MENTHOL AND METHYL SALICYLATE 7.6; 29 G/100G; G/100G
OINTMENT TOPICAL EVERY 6 HOURS PRN
Status: DISCONTINUED | OUTPATIENT
Start: 2023-01-12 | End: 2023-01-13 | Stop reason: HOSPADM

## 2023-01-12 RX ADMIN — MENTHOL AND METHYL SALICYLATE: 7.6; 29 OINTMENT TOPICAL at 12:01

## 2023-01-12 RX ADMIN — SODIUM CHLORIDE, POTASSIUM CHLORIDE, SODIUM LACTATE AND CALCIUM CHLORIDE: 600; 310; 30; 20 INJECTION, SOLUTION INTRAVENOUS at 09:24

## 2023-01-12 RX ADMIN — ERYTHROMYCIN 1 G: 20 GEL TOPICAL at 08:42

## 2023-01-12 RX ADMIN — SODIUM CHLORIDE, POTASSIUM CHLORIDE, SODIUM LACTATE AND CALCIUM CHLORIDE: 600; 310; 30; 20 INJECTION, SOLUTION INTRAVENOUS at 00:11

## 2023-01-12 RX ADMIN — ERYTHROMYCIN 1 G: 20 GEL TOPICAL at 00:05

## 2023-01-12 RX ADMIN — DIATRIZOATE MEGLUMINE AND DIATRIZOATE SODIUM 75 ML: 660; 100 SOLUTION ORAL; RECTAL at 10:39

## 2023-01-12 ASSESSMENT — ACTIVITIES OF DAILY LIVING (ADL)
ADLS_ACUITY_SCORE: 35

## 2023-01-12 NOTE — PLAN OF CARE
Summary: Nausea & Vomiting, abd. Pain, recurrent small bowel obstruction  DATE & TIME: 1/12/23, 7737-7075   Cognitive Concerns/ Orientation : A&Ox4, calm, cooperative   BEHAVIOR & AGGRESSION TOOL COLOR: GREEN  ABNL VS/O2: VSS, on RA  MOBILITY: Independent in room  PAIN MANAGMENT: denies  DIET: NPO until X-ray study  BOWEL/BLADDER: Continent, no BM  ABNL LAB/BG: WNL  DRAIN/DEVICES: PIV  mL/hr  TELEMETRY RHYTHM: n/a  SKIN: WNL  TESTS/PROCEDURES:  Post Gastrografin imaging to be done at 1830 today in ER CT *[id number] (call to make sure okay to go down) ER CT is aware of study being done.  D/C DAY/GOALS/PLACE: tbd  OTHER IMPORTANT INFO: Changed to Observation today

## 2023-01-12 NOTE — PROGRESS NOTES
"Acute Care Surgery  Progress Note    Feeling better today than he did yesterday. He has passed some gas, but no bowel movement yet. His pain has significantly improved but he still endorses a generalized discomfort in his abdomen, especially the lower quadrants.     /63 (BP Location: Left arm)   Pulse 52   Temp 97.6  F (36.4  C) (Oral)   Resp 16   Ht 1.854 m (6' 1\")   Wt 91.9 kg (202 lb 11.2 oz)   SpO2 95%   BMI 26.74 kg/m    Gen: laying in bed, no acute distress, significantly improved from prior  Resp: comfortable on room air  Abd: soft, non-distended, mild diffuse tenderness.     A/P: otherwise healthy 32 year old male who presented with recurrent small bowel obstruction. He is improving clinically; abdomen is benign, with no indication for urgent surgery. Awaiting full return of bowel function.     -- Gastrograffin study today (ordered)  -- NPO, MIVF  -- Surgery will follow    Kendal Fernandes MD    "

## 2023-01-12 NOTE — PROGRESS NOTES
"[facility]    Medicine Progress Note - Hospitalist Service       Date of Admission:  1/11/2023  Assessment & Plan   Montrell Jaramillo is a 32 year-old male with no significant past medical history who presents with nausea, vomiting and abdominal pain. Admitted on 1/11/2023.     Small bowel obstruction  *Presented with nausea, vomiting, abdominal pain.   *CT abd/pelvis with multiple dilated mid to distal small bowel loops leading to decompression at RLQ, compatible with SBO.   *No prior history of abdominal surgeries in the past. Abdominal exam non-surgical at this time.   - Symptomatically improving  - General surgery consulted, appreciate assistance   - Gastrografin challenge in process  - Diet per general surgery   - Continue IV fluids until diet advanced  - Analgesics, anti-emetics PRN       Cystic acne, back  *Recently prescribed 1 month of TMP-SMX, erythromycin gel, triamcinolone by dermatology.  *Reports significantly improved and had some stomach upset with TMP-SMX  - Hold TMP-SMX  - Continue erythromycin gel, can use home supply     Clinically Significant Risk Factors          # Hypocalcemia: Lowest Ca = 8.3 mg/dL in last 2 days, will monitor and replace as appropriate               # Overweight: Estimated body mass index is 26.74 kg/m  as calculated from the following:    Height as of this encounter: 1.854 m (6' 1\").    Weight as of this encounter: 91.9 kg (202 lb 11.2 oz)., PRESENT ON ADMISSION            Diet: NPO for Medical/Clinical Reasons Except for: Meds    DVT Prophylaxis: Pneumatic Compression Devices  Morris Catheter: Not present  Code Status: Full Code      Disposition Plan      Expected Discharge Date: 01/13/2023             Entered: Jameel Marcum MD 01/12/2023, 12:56 PM       The patient's care was discussed with the Patient.    Jameel Marcum MD  Hospitalist Service  [facility] " [facility]    ______________________________________________________________________    Interval History   No acute events overnight.  Reports he started passing gas last night and his abdominal pain is much improved.  Denies any other new symptoms.    Data reviewed today: I reviewed all medications, new labs and imaging results over the last 24 hours. I personally reviewed no images or EKG's today.    Physical Exam   Vital Signs: Temp: 97.6  F (36.4  C) Temp src: Oral BP: 118/63 Pulse: 52   Resp: 16 SpO2: 95 % O2 Device: None (Room air)    Weight: 202 lbs 11.2 oz    Constitutional: Well-appearing, NAD  Respiratory: Normal respiratory effort at rest, nonlabored breathing on room air  Cardiovascular:    GI: Soft, minimal LLQ tenderness, non-distended.   Skin/Integumen: Warm, dry  Other:      Data   Recent Labs   Lab 01/12/23  0819 01/11/23  0538   WBC 6.8 10.2   HGB 12.8* 15.1   MCV 95 94    220    135   POTASSIUM 4.0 4.7   CHLORIDE 105 101   CO2 26 24   BUN 13 17   CR 1.03 1.21   ANIONGAP 6 10   LINDA 8.3* 9.8   GLC 88 145*   ALBUMIN  --  4.5   PROTTOTAL  --  8.0   BILITOTAL  --  1.4*   ALKPHOS  --  52   ALT  --  47   AST  --  24   LIPASE  --  168       No results found for this or any previous visit (from the past 24 hour(s)).  Medications     lactated ringers 100 mL/hr at 01/12/23 0924       erythromycin with ethanol  1 applicator Topical BID     sodium chloride (PF)  3 mL Intracatheter Q8H

## 2023-01-12 NOTE — UTILIZATION REVIEW
"  Admission Status; Secondary Review Determination         Under the authority of the Utilization Management Committee, the utilization review process indicated a secondary review on the above patient.  The review outcome is based on review of the medical records, discussions with staff, and applying clinical experience noted on the date of the review.          (x) Observation Status Appropriate - This patient does not meet hospital inpatient criteria and is placed in observation status. If this patient's primary payer is Medicare and was admitted as an inpatient, Condition Code 44 should be used and patient status changed to \"observation\".     RATIONALE FOR DETERMINATION    healthy 32 year old male who presented with recurrent small bowel obstruction. He is improving clinically; abdomen is benign, with no indication for urgent surgery. Awaiting full return of bowel function.      The severity of illness, intensity of service provided, expected LOS and risk for adverse outcome make the care appropriate for further observation; however, doesn't meet criteria for hospital inpatient admission. Dr Marcum notified of this determination.    This document was produced using voice recognition software.      The information on this document is developed by the utilization review team in order for the business office to ensure compliance.  This only denotes the appropriateness of proper admission status and does not reflect the quality of care rendered.         The definitions of Inpatient Status and Observation Status used in making the determination above are those provided in the CMS Coverage Manual, Chapter 1 and Chapter 6, section 70.4.      Sincerely,     HENNY ALBRECHT MD    System Medical Director  Utilization Management  [facility].      "

## 2023-01-12 NOTE — PROGRESS NOTES
RECEIVING UNIT ED HANDOFF REVIEW    ED Nurse Handoff Report was reviewed by: Jermain Araiza RN on January 11, 2023 at 8:02 PM

## 2023-01-12 NOTE — PROGRESS NOTES
"Neuro: A&O x4, ZEV GRAVES. Denies pain    Most Recent Vitals: Blood pressure 114/60, pulse 57, temperature 98.8  F (37.1  C), temperature source Oral, resp. rate 16, height 1.854 m (6' 1\"), weight 91.9 kg (202 lb 11.2 oz), SpO2 94 %.      Cardiac: Patricio in high 50s. Afebrile.    GI/: LLQ hypoactive and tender.    Lines/Access/Gtts: R PIV 18G. LR 100mL/hr    Mobility/Activity: Independent in room    LABS: Unremarkable        Please review flowsheets for detailed assessments and interventions.    "

## 2023-01-13 VITALS
SYSTOLIC BLOOD PRESSURE: 133 MMHG | WEIGHT: 209.8 LBS | BODY MASS INDEX: 27.8 KG/M2 | TEMPERATURE: 98.6 F | HEIGHT: 73 IN | RESPIRATION RATE: 16 BRPM | HEART RATE: 66 BPM | DIASTOLIC BLOOD PRESSURE: 67 MMHG | OXYGEN SATURATION: 98 %

## 2023-01-13 PROCEDURE — 99238 HOSP IP/OBS DSCHRG MGMT 30/<: CPT | Performed by: PHYSICIAN ASSISTANT

## 2023-01-13 PROCEDURE — G0378 HOSPITAL OBSERVATION PER HR: HCPCS

## 2023-01-13 PROCEDURE — 99231 SBSQ HOSP IP/OBS SF/LOW 25: CPT | Performed by: SURGERY

## 2023-01-13 PROCEDURE — 258N000003 HC RX IP 258 OP 636: Performed by: INTERNAL MEDICINE

## 2023-01-13 RX ORDER — SULFAMETHOXAZOLE/TRIMETHOPRIM 800-160 MG
1 TABLET ORAL 2 TIMES DAILY WITH MEALS
Start: 2023-01-20 | End: 2023-01-13

## 2023-01-13 RX ADMIN — SODIUM CHLORIDE, POTASSIUM CHLORIDE, SODIUM LACTATE AND CALCIUM CHLORIDE: 600; 310; 30; 20 INJECTION, SOLUTION INTRAVENOUS at 06:13

## 2023-01-13 ASSESSMENT — ACTIVITIES OF DAILY LIVING (ADL)
ADLS_ACUITY_SCORE: 35

## 2023-01-13 NOTE — PLAN OF CARE
Goal Outcome Evaluation:         Summary: Nausea & Vomiting, abd. Pain, recurrent small bowel obstruction  DATE & TIME: 01/12/23-01/13/23 8868-7137  Cognitive Concerns/ Orientation : A&Ox4, calm, cooperative   BEHAVIOR & AGGRESSION TOOL COLOR: GREEN  ABNL VS/O2: VSS, on RA  MOBILITY: Independent in room  PAIN MANAGMENT: Denies  DIET: NPO until X-ray study  BOWEL/BLADDER: Continent, no BM  ABNL LAB/BG: WNL  DRAIN/DEVICES: L PIV  mL/hr  TELEMETRY RHYTHM: n/a  SKIN: WNL  TESTS/PROCEDURES: Gastrografin completed 1/12/23  D/C DAY/GOALS/PLACE: Pending   OTHER IMPORTANT INFO: Changed to Observation status 01/12/23

## 2023-01-13 NOTE — PROGRESS NOTES
PRIMARY DIAGNOSIS: Recurrent SBO    OUTPATIENT/OBSERVATION GOALS TO BE MET BEFORE DISCHARGE  1. Nausea/Vomiting/Diarrhea symptoms improved: met    2. Improving of abdominal cramping/pain: met    3. Diagnostic tests/consults completed: partially met

## 2023-01-13 NOTE — DISCHARGE SUMMARY
[facility]  Hospitalist Discharge Summary      Date of Admission:  1/11/2023  Date of Discharge:  1/13/2023  Discharging Provider: Naida Pakr PA-C  Discharge Service: Hospitalist Service    Discharge Diagnoses   Small bowel obstruction    Follow-ups Needed After Discharge   Follow-up Appointments     Follow-up and recommended labs and tests       Follow up with primary care provider, Summer Zuniga, within 7 days for   hospital follow- up.  No follow up labs or test are needed.      Follow up with your Dermatologist via telephone to discuss discontinuation   of Bactrim and reeval dermatitis regimen given improvement in symptoms.     Follow up with Dr. Mason De Jesus of General Surgery outpatient for   elective exploratory laparoscopy.           Unresulted Labs Ordered in the Past 30 Days of this Admission     No orders found from 12/12/2022 to 1/12/2023.      These results will be followed up by PCP    Discharge Disposition   Discharged to home  Condition at discharge: Stable    Hospital Course   Montrell Jaramillo is a 32 year-old male with no significant past medical history who presents with nausea, vomiting and abdominal pain. Admitted on 1/11/2023.     Small bowel obstruction  *Presented with nausea, vomiting, abdominal pain.   *CT abd/pelvis with multiple dilated mid to distal small bowel loops leading to decompression at RLQ, compatible with SBO.   *No prior history of abdominal surgeries in the past. Abdominal exam non-surgical at this time.   *Gastrografin challenge notes oral contrast throughout the colon suggesting the absence of a high-grade small bowel obstruction. Assessment of small bowel caliber is precluded by a paucity of bowel gas and small bowel contrast.  A partial small bowel obstruction cannot be excluded, given the absence of 2 to 4 hour delayed imaging.  * Pt passing flatus, with several BMs, no nausea and active bowel sounds 1/13 AM  - General  surgery followed during stay. Refer to note by Dr. De Jesus. Contact info provided to patient for possible outpatient ex lap    - Diet advanced to mechanical soft/low fiber diet for the next 7-10 days  - Instructed to return to the ER with recurrent/worsening symptoms     Recent dx of dermatitis   *Recently prescribed 1 month of TMP-SMX, erythromycin gel, triamcinolone by dermatology.  *Reports significantly improved and had some stomach upset with TMP-SMX  - Held TMP-SMX during stay, pt plans to continue to hold at discharge and discuss with his dermatologist   - Continue erythromycin gel, can use home supply     Consultations This Hospital Stay   SURGERY GENERAL IP CONSULT    Code Status   Full Code    Time Spent on this Encounter   I, Naida Park PA-C, personally saw the patient today and spent less than or equal to 30 minutes discharging this patient.       Naida Park PA-C  [place] [address] [facility]  [address] [place] [address]  Phone: [phone]  ______________________________________________________________________    Physical Exam   Vital Signs: Temp: 98.6  F (37  C) Temp src: Oral BP: 133/67 Pulse: 66   Resp: 16 SpO2: 98 % O2 Device: None (Room air)    Weight: 209 lbs 12.8 oz    Refer to my physical exam from progress note from earlier today.        Primary Care Physician   Summer Zuniga    Discharge Orders      Reason for your hospital stay    You were hospitalized for further evaluation and treatment of a small bowel obstruction.     Follow-up and recommended labs and tests     Follow up with primary care provider, Summer Zuniga, within 7 days for hospital follow- up.  No follow up labs or test are needed.      Follow up with your Dermatologist via telephone to discuss discontinuation of Bactrim and reeval dermatitis regimen given improvement in symptoms.     Follow up with Dr. Mason De Jesus of General Surgery  outpatient for elective exploratory laparoscopy.     Activity    Your activity upon discharge: activity as tolerated     Discharge Instructions    1. Low fiber/mechanical soft diet for the next 7-10 days   2. Follow-up outpatient with General Surgery for consideration of exploratory laparoscopy   3. Return to the ER with recurrent/worsening symptoms   4. Follow up with your dermatologist to discuss your dermatitis regimen. Bactrim was held during this hospital stay. Plan to continue to hold it until you talk with your dermatologist     Diet    Follow this diet upon discharge: Orders Placed This Encounter      Combination Diet Mechanical/Dental Soft Diet; Low Fiber Diet       Significant Results and Procedures   Most Recent 3 CBC's:  Recent Labs   Lab Test 01/12/23  0819 01/11/23  0538   WBC 6.8 10.2   HGB 12.8* 15.1   MCV 95 94    220     Most Recent 3 BMP's:  Recent Labs   Lab Test 01/12/23  0819 01/11/23  0538    135   POTASSIUM 4.0 4.7   CHLORIDE 105 101   CO2 26 24   BUN 13 17   CR 1.03 1.21   ANIONGAP 6 10   LINDA 8.3* 9.8   GLC 88 145*     Most Recent 2 LFT's:  Recent Labs   Lab Test 01/11/23  0538   AST 24   ALT 47   ALKPHOS 52   BILITOTAL 1.4*   ,   Results for orders placed or performed during the hospital encounter of 01/11/23   CT Abdomen Pelvis w Contrast    Narrative    CT ABDOMEN/PELVIS WITH CONTRAST January 11, 2023 7:01 AM    CLINICAL HISTORY: Epigastric and left lower quadrant pain.    TECHNIQUE: CT scan of the abdomen and pelvis was performed following  injection of IV contrast. Multiplanar reformats were obtained. Dose  reduction techniques were used.  CONTRAST: 101mL Isovue-370.    COMPARISON: None.    FINDINGS:   LOWER CHEST: Normal.    HEPATOBILIARY: Normal.    PANCREAS: Normal.    SPLEEN: Normal.    ADRENAL GLANDS: Normal.    KIDNEYS/BLADDER: No significant mass, stones, or hydronephrosis. There  are simple or benign cysts. No follow up is needed.    BOWEL: Multiple dilated mid to  distal small bowel loops leading to  fecalized small bowel at the pelvis level. Transition to decompression  at the distal ileum at the right lower quadrant. No convincing  evidence for appendicitis. Limited view of the appendix. No free air  at this time. Decompressed colon.    PELVIC ORGANS: Small pelvic fluid noted.    ADDITIONAL FINDINGS: None.    MUSCULOSKELETAL: Normal.      Impression    IMPRESSION: Multiple dilated mid to distal small bowel loops leading  to decompression at the right lower quadrant. Findings compatible with  small bowel obstruction. There is small free pelvic fluid. No free air  is seen.    NI VILLATORO MD         SYSTEM ID:  [id number]   XR Abdomen 1 View    Narrative    EXAM: XR ABDOMEN 1 VIEW  LOCATION: [facility]  DATE/TIME: 1/12/2023 6:29 PM    INDICATION: Small bowel obstruction; Gastrografin challenge; oral contrast administered at 10:39 AM today.   COMPARISON: CT 01/11/2023.      Impression    IMPRESSION: Oral contrast throughout the colon suggesting the absence of a high-grade small bowel obstruction.    Assessment of small bowel caliber is precluded by a paucity of bowel gas and small bowel contrast.     A partial small bowel obstruction cannot be excluded, given the absence of 2 to 4 hour delayed imaging.       Discharge Medications   Current Discharge Medication List      CONTINUE these medications which have NOT CHANGED    Details   erythromycin with ethanol (EMGEL) 2 % gel Apply 1 applicator topically 2 times daily Affected area on back      triamcinolone (KENALOG) 0.1 % external cream APPLY TWICE DAILY TO THE AFFECTED AREA ON BACK FOR UP TO 2 WEEKS AT A TIME. TAKE A 2 WEEK BREAK AND REPEAT AS NEEDED         STOP taking these medications       sulfamethoxazole-trimethoprim (BACTRIM DS) 800-160 MG tablet Comments:   Reason for Stopping:             Allergies   Allergies   Allergen Reactions     Penicillins Rash

## 2023-01-13 NOTE — PROGRESS NOTES
PRIMARY DIAGNOSIS: Recurrent SBO     OUTPATIENT/OBSERVATION GOALS TO BE MET BEFORE DISCHARGE  1. Nausea/Vomiting/Diarrhea symptoms improved: met     2. Improving of abdominal cramping/pain: met     3. Diagnostic tests/consults completed: partially met        A&Ox4. VSS on Rm Air. C/o mild abdominal tenderness w/ palpation, otherwise states pain/discomfort improved. Denies N/V. Passing flatus, also states he had liquid/watery light brown BM this evening prior to abd xray. Gastrografin challenged completed; see results. IND in Rm. NPO. New PIV to LFA; LR @ 100 mL/hr. Discharge pending; awaiting f/u w/ surgery, potential for laparoscopy.

## 2023-01-13 NOTE — PROGRESS NOTES
On-call hospitalist notified of abd xray results, inquiring if able to advance diet. Awaiting new orders if desired.

## 2023-01-13 NOTE — PROGRESS NOTES
"[facility]    Medicine Progress Note - Hospitalist Service    Date of Admission:  1/11/2023    Assessment & Plan   Montrell Jaramillo is a 32 year-old male with no significant past medical history who presents with nausea, vomiting and abdominal pain. Admitted on 1/11/2023.     Small bowel obstruction  *Presented with nausea, vomiting, abdominal pain.   *CT abd/pelvis with multiple dilated mid to distal small bowel loops leading to decompression at RLQ, compatible with SBO.   *No prior history of abdominal surgeries in the past. Abdominal exam non-surgical at this time.   *Gastrografin challenge notes oral contrast throughout the colon suggesting the absence of a high-grade small bowel obstruction. Assessment of small bowel caliber is precluded by a paucity of bowel gas and small bowel contrast.  A partial small bowel obstruction cannot be excluded, given the absence of 2 to 4 hour delayed imaging.  * Pt passing flatus, with several BMs, no nausea and active bowel sounds 1/13 AM  - General surgery following, appreciate assistance   - IVF with LR at 100 ccs/hr until diet advanced   - Analgesics, anti-emetics PRN    - Clear liquid diet, further advancement as per General Surgery   - If able to advance diet without recurrent symptoms, likely discharge this afternoon      Dermatitis   *Recently prescribed 1 month of TMP-SMX, erythromycin gel, triamcinolone by dermatology.  *Reports significantly improved and had some stomach upset with TMP-SMX  - Hold TMP-SMX  - Continue erythromycin gel, can use home supply      Diet: Clear Liquid Diet    DVT Prophylaxis: Ambulate every shift  Morris Catheter: Not present  Lines: None     Cardiac Monitoring: None  Code Status: Full Code      Clinically Significant Risk Factors                        # Overweight: Estimated body mass index is 27.68 kg/m  as calculated from the following:    Height as of this encounter: 1.854 m (6' 1\").    Weight as of this " encounter: 95.2 kg (209 lb 12.8 oz)., PRESENT ON ADMISSION         Disposition Plan     Expected Discharge Date: 01/13/2023                The patient's care was discussed with the Attending Physician, Dr. Hodge, Bedside Nurse and Patient.    Naida Park PA-C  Hospitalist Service  [facility]  Securely message with [employer]. (more info)  Text page via [facility] Paging/Directory   ______________________________________________________________________    Interval History   Passing gas, several BMs. No nausea. Inquiring about diet.     Physical Exam   Vital Signs: Temp: 98.6  F (37  C) Temp src: Oral BP: 133/67 Pulse: 66   Resp: 16 SpO2: 98 % O2 Device: None (Room air)    Weight: 209 lbs 12.8 oz    CONSTITUTIONAL: Pt laying in bed, dressed in hospital garb. Appears comfortable. Cooperative with interview.   HEENT: Normocephalic, atraumatic.   CARDIOVASCULAR: RRR, no murmurs, rubs, or extra heart sounds appreciated. Pulses +2/4 and regular in upper and lower extremities, bilaterally.   RESPIRATORY: No increased work of breathing.  CTA, bilat; no wheezes, rales, or rhonchi appreciated.  GASTROINTESTINAL:  Abdomen soft, non-distended. BS auscultated in all four quadrants. Negative for tenderness to palpation.  No masses or organomegaly noted.  MUSCULOSKELETAL: No gross deformities noted. Normal muscle tone.   HEMATOLOGIC/LYMPHATIC/IMMUNOLOGIC: Negative for lower extremity edema, bilaterally.  NEUROLOGIC: Alert and oriented to person, place, and time.  strength intact. No focal neuro deficits.   SKIN: Warm, dry, intact.     Medical Decision Making       35 MINUTES SPENT BY ME on the date of service doing chart review, history, exam, documentation & further activities per the note.      Data         Imaging results reviewed over the past 24 hrs:   Recent Results (from the past 24 hour(s))   XR Abdomen 1 View    Narrative    EXAM: XR ABDOMEN 1 VIEW  LOCATION: [facility]  [facility]  DATE/TIME: 1/12/2023 6:29 PM    INDICATION: Small bowel obstruction; Gastrografin challenge; oral contrast administered at 10:39 AM today.   COMPARISON: CT 01/11/2023.      Impression    IMPRESSION: Oral contrast throughout the colon suggesting the absence of a high-grade small bowel obstruction.    Assessment of small bowel caliber is precluded by a paucity of bowel gas and small bowel contrast.     A partial small bowel obstruction cannot be excluded, given the absence of 2 to 4 hour delayed imaging.

## 2023-01-13 NOTE — PLAN OF CARE
Goal Outcome Evaluation:\Discharge    Patient discharged to home  Care plan note  Discharge instructions reviewed, questions answered, meds returned to patient prior to discharge. IV removed. Friend will pick patient up for discharge    Listed belongings gathered and given to patient (including from security/pharmacy). yes  Care Plan and Patient education resolved: yes  Prescriptions if needed, hard copies sent with patient NA  Medication Bin checked and emptied on discharge yes  SW/care coordinator/charge RN aware of discharge: yes

## 2023-01-13 NOTE — PROGRESS NOTES
Patient doing well  Had bowel movement yesterday afternoon that he describes as being somewhat bulky followed by liquid.  Consistent with passage of fecalized small bowel material.  Denies nausea.  Reports no significant discomfort or pain.  Follow-up imaging reviewed.  Contrast through to the colon without significant residual dilated small bowel.    We had a lengthy discussion again regarding his management options.  I think it is fine to advance him to a mechanical soft low fiber diet.  Thereafter can be discharged home.  From there would recommend following this diet for at least the next 7 to 10 days.  Thereafter we discussed behavior modification as a means of trying to avoid additional episodes.  He did express interest in possibly undergoing laparoscopy at some point in the future.  He was given my clinic contact information to schedule elective surgery at his convenience.  Recurrent signs and symptoms of bowel obstruction were reviewed.  He was instructed to return to the hospital should these occur.  His questions were answered.  Total time spent was 20 minutes with greater than 50% in face-to-face consultation.  General surgery will sign off at this time

## 2023-01-18 NOTE — PATIENT INSTRUCTIONS
For informational purposes only. Not to replace the advice of your health care provider. Copyright   2003,  [employer] [facility]. All rights reserved. Clinically reviewed by Tarsha Cesar MD. [employer] [id number] - REV .  Preparing for Your Surgery  Getting started  A nurse will call you to review your health history and instructions. They will give you an arrival time based on your scheduled surgery time. Please be ready to share:    Your doctor's clinic name and phone number    Your medical, surgical, and anesthesia history    A list of allergies and sensitivities    A list of medicines, including herbal treatments and over-the-counter drugs    Whether the patient has a legal guardian (ask how to send us the papers in advance)  Please tell us if you're pregnant--or if there's any chance you might be pregnant. Some surgeries may injure a fetus (unborn baby), so they require a pregnancy test. Surgeries that are safe for a fetus don't always need a test, and you can choose whether to have one.   If you have a child who's having surgery, please ask for a copy of Preparing for Your Child's Surgery.    Preparing for surgery    Within 10 to 30 days of surgery: Have a pre-op exam (sometimes called an H&P, or History and Physical). This can be done at a clinic or pre-operative center.  ? If you're having a , you may not need this exam. Talk to your care team.    At your pre-op exam, talk to your care team about all medicines you take. If you need to stop any medicines before surgery, ask when to start taking them again.  ? We do this for your safety. Many medicines can make you bleed too much during surgery. Some change how well surgery (anesthesia) drugs work.    Call your insurance company to let them know you're having surgery. (If you don't have insurance, call [phone].)    Call your clinic if there's any change in your health. This includes signs of a cold or flu (sore throat, runny nose,  cough, rash, fever). It also includes a scrape or scratch near the surgery site.    If you have questions on the day of surgery, call your hospital or surgery center.  Eating and drinking guidelines  For your safety: Unless your surgeon tells you otherwise, follow the guidelines below.    Eat and drink as usual until 8 hours before you arrive for surgery. After that, no food or milk.    Drink clear liquids until 2 hours before you arrive. These are liquids you can see through, like water, Gatorade, and Propel Water. They also include plain black coffee and tea (no cream or milk), candy, and breath mints. You can spit out gum when you arrive.    If you drink alcohol: Stop drinking it the night before surgery.    If your care team tells you to take medicine on the morning of surgery, it's okay to take it with a sip of water.  Preventing infection    Shower or bathe the night before and morning of your surgery. Follow the instructions your clinic gave you. (If no instructions, use regular soap.)    Don't shave or clip hair near your surgery site. We'll remove the hair if needed.    Don't smoke or vape the morning of surgery. You may chew nicotine gum up to 2 hours before surgery. A nicotine patch is okay.  ? Note: Some surgeries require you to completely quit smoking and nicotine. Check with your surgeon.    Your care team will make every effort to keep you safe from infection. We will:  ? Clean our hands often with soap and water (or an alcohol-based hand rub).  ? Clean the skin at your surgery site with a special soap that kills germs.  ? Give you a special gown to keep you warm. (Cold raises the risk of infection.)  ? Wear special hair covers, masks, gowns and gloves during surgery.  ? Give antibiotic medicine, if prescribed. Not all surgeries need antibiotics.  What to bring on the day of surgery    Photo ID and insurance card    Copy of your health care directive, if you have one    Glasses and hearing aids (bring  cases)  ? You can't wear contacts during surgery    Inhaler and eye drops, if you use them (tell us about these when you arrive)    CPAP machine or breathing device, if you use them    A few personal items, if spending the night    If you have . . .  ? A pacemaker, ICD (cardiac defibrillator) or other implant: Bring the ID card.  ? An implanted stimulator: Bring the remote control.  ? A legal guardian: Bring a copy of the certified (court-stamped) guardianship papers.  Please remove any jewelry, including body piercings. Leave jewelry and other valuables at home.  If you're going home the day of surgery    You must have a responsible adult drive you home. They should stay with you overnight as well.    If you don't have someone to stay with you, and you aren't safe to go home alone, we may keep you overnight. Insurance often won't pay for this.  After surgery  If it's hard to control your pain or you need more pain medicine, please call your surgeon's office.  Questions?   If you have any questions for your care team, list them here: _________________________________________________________________________________________________________________________________________________________________________ ____________________________________ ____________________________________ ____________________________________

## 2023-01-18 NOTE — PROGRESS NOTES
[facility] [address] [place] [address]  Phone: [phone]  Fax: [phone]  Primary Provider: Summer Rae  Pre-op Performing Provider: SUMMER RAE      PREOPERATIVE EVALUATION:  Today's date: 1/19/2023    Montrell Jaramillo is a 32 year old male who presents for a preoperative evaluation.    Surgical Information:  Surgery/Procedure: chest surgery - removing fat glands   Surgery Location: [facility]   Surgeon: Justin Patel   Surgery Date: 2/1/23  Time of Surgery: 11:00  Where patient plans to recover: At home with family  Fax number for surgical facility: [phone]    Type of Anesthesia Anticipated: General    Assessment & Plan     The proposed surgical procedure is considered LOW risk.    Preop general physical exam  Gynecomastia, male  Elective cosmetic procedure.     H/O small bowel obstruction  Admitted 1/11 to 1/13/2023 for first occurrence of small bowel obstruction. No structural causative aetiology found. No prior h/o abdominal surgeries / adhesions. Spontaneous resolution without surgical intervention. Was offered out patient elective exploratory laparoscopy with general surgery, but declined. Denies any ongoing abdominal pain or change in bowel habit.     Risks and Recommendations:  The patient has the following additional risks and recommendations for perioperative complications:  -recent small bowel obstruction : fully recovered.     Medication Instructions:  Patient is on no chronic medications    RECOMMENDATION:  APPROVAL GIVEN to proceed with proposed procedure, without further diagnostic evaluation.  6}    Subjective     HPI related to upcoming procedure: bilateral gynecomastia since teenage years.      Preop Questions 1/12/2023   1. Have you ever had a heart attack or stroke? No   2. Have you ever had surgery on your heart or blood vessels, such as a stent placement, a coronary artery bypass, or surgery on an artery in your head,  neck, heart, or legs? No   3. Do you have chest pain with activity? No   4. Do you have a history of  heart failure? No   5. Do you currently have a cold, bronchitis or symptoms of other infection? No   6. Do you have a cough, shortness of breath, or wheezing? No   7. Do you or anyone in your family have previous history of blood clots? No   8. Do you or does anyone in your family have a serious bleeding problem such as prolonged bleeding following surgeries or cuts? No   9. Have you ever had problems with anemia or been told to take iron pills? No   10. Have you had any abnormal blood loss such as black, tarry or bloody stools? No   11. Have you ever had a blood transfusion? No   12. Are you willing to have a blood transfusion if it is medically needed before, during, or after your surgery? Yes   13. Have you or any of your relatives ever had problems with anesthesia? No   14. Do you have sleep apnea, excessive snoring or daytime drowsiness? No   15. Do you have any artifical heart valves or other implanted medical devices like a pacemaker, defibrillator, or continuous glucose monitor? No   16. Do you have artificial joints? No   17. Are you allergic to latex? No       Health Care Directive:  Patient does not have a Health Care Directive or Living Will: None    Preoperative Review of :   reviewed - no record of controlled substances prescribed.      Review of Systems  CONSTITUTIONAL: NEGATIVE for fever, chills, change in weight  ENT/MOUTH: NEGATIVE for ear, mouth and throat problems  RESP: NEGATIVE for significant cough or SOB  CV: NEGATIVE for chest pain, palpitations or peripheral edema      Allergies   Allergen Reactions     Penicillins Rash        Social History     Tobacco Use     Smoking status: Never     Smokeless tobacco: Not on file   Substance Use Topics     Alcohol use: Yes     Comment: Socially on weekends         Objective     /71   Pulse 69   Temp 97.8  F (36.6  C) (Temporal)   Resp 16   " Ht 1.854 m (6' 1\")   Wt 94.8 kg (209 lb)   SpO2 97%   BMI 27.57 kg/m      Physical Exam    GENERAL APPEARANCE: healthy, alert and no distress     EYES: EOMI,  PERRL     HENT: ear canals and TM's normal and nose and mouth without ulcers or lesions     NECK: no adenopathy, no asymmetry, masses, or scars and thyroid normal to palpation     RESP: lungs clear to auscultation - no rales, rhonchi or wheezes     CV: regular rates and rhythm, normal S1 S2, no S3 or S4 and no murmur, click or rub     ABDOMEN:  soft, nontender, no HSM or masses and bowel sounds normal     MS: extremities normal- no gross deformities noted, no evidence of inflammation in joints, FROM in all extremities.     SKIN: no suspicious lesions or rashes     NEURO: Normal strength and tone, sensory exam grossly normal, mentation intact and speech normal     PSYCH: mentation appears normal. and affect normal/bright     LYMPHATICS: No cervical adenopathy    Recent Labs   Lab Test 01/12/23  0819 01/11/23  0538   HGB 12.8* 15.1    220    135   POTASSIUM 4.0 4.7   CR 1.03 1.21        Diagnostics:  No labs were ordered during this visit.   No EKG required, no history of coronary heart disease, significant arrhythmia, peripheral arterial disease or other structural heart disease.    "

## 2023-01-19 ENCOUNTER — OFFICE VISIT (OUTPATIENT)
Dept: FAMILY MEDICINE | Facility: CLINIC | Age: 33
End: 2023-01-19

## 2023-01-19 VITALS
OXYGEN SATURATION: 97 % | HEIGHT: 73 IN | BODY MASS INDEX: 27.7 KG/M2 | RESPIRATION RATE: 16 BRPM | TEMPERATURE: 97.8 F | WEIGHT: 209 LBS | DIASTOLIC BLOOD PRESSURE: 71 MMHG | HEART RATE: 69 BPM | SYSTOLIC BLOOD PRESSURE: 131 MMHG

## 2023-01-19 DIAGNOSIS — Z01.818 PREOP GENERAL PHYSICAL EXAM: Primary | ICD-10-CM

## 2023-01-19 DIAGNOSIS — Z87.19 H/O SMALL BOWEL OBSTRUCTION: ICD-10-CM

## 2023-01-19 DIAGNOSIS — N62 GYNECOMASTIA, MALE: ICD-10-CM

## 2023-01-19 PROBLEM — K56.609 SBO (SMALL BOWEL OBSTRUCTION) (H): Status: RESOLVED | Noted: 2023-01-11 | Resolved: 2023-01-19

## 2023-01-19 PROCEDURE — 99204 OFFICE O/P NEW MOD 45 MIN: CPT | Performed by: FAMILY MEDICINE

## 2023-01-23 NOTE — PROGRESS NOTES
1/20/23 faxed this preop to  Radha Montoya @ [phone]    Huang Veronica,   [facility]  [phone]

## 2023-05-21 ENCOUNTER — HEALTH MAINTENANCE LETTER (OUTPATIENT)
Age: 33
End: 2023-05-21

## 2023-06-08 ENCOUNTER — OFFICE VISIT (OUTPATIENT)
Dept: FAMILY MEDICINE | Facility: CLINIC | Age: 33
End: 2023-06-08

## 2023-06-08 VITALS
HEART RATE: 60 BPM | WEIGHT: 204.8 LBS | DIASTOLIC BLOOD PRESSURE: 89 MMHG | BODY MASS INDEX: 27.14 KG/M2 | OXYGEN SATURATION: 98 % | SYSTOLIC BLOOD PRESSURE: 129 MMHG | HEIGHT: 73 IN

## 2023-06-08 DIAGNOSIS — R10.9 LEFT FLANK PAIN, CHRONIC: Primary | ICD-10-CM

## 2023-06-08 DIAGNOSIS — Z87.19 H/O SMALL BOWEL OBSTRUCTION: ICD-10-CM

## 2023-06-08 DIAGNOSIS — G89.29 LEFT FLANK PAIN, CHRONIC: Primary | ICD-10-CM

## 2023-06-08 LAB
% GRANULOCYTES: 54 % (ref 42.2–75.2)
BACTERIA (RMG): NORMAL
BILIRUBIN (RMG): NEGATIVE
BLOOD (RMG): NEGATIVE
CASTS (RMG): NORMAL
COLOR UR: YELLOW
CRYSTAL (RMG): NORMAL
EPITHELIAL (RMG): NORMAL
GLUCOSE (RMG): NEGATIVE
HCT VFR BLD AUTO: 44.1 % (ref 39–51)
HEMOGLOBIN: 14.6 G/DL (ref 13.4–17.5)
KETONE (RMG): NEGATIVE
LEUKOCYTE (RMG): NEGATIVE
LYMPHOCYTES NFR BLD AUTO: 37.8 % (ref 20.5–51.1)
MCH RBC QN AUTO: 31.2 PG (ref 27–31)
MCHC RBC AUTO-ENTMCNC: 33.1 G/DL (ref 33–37)
MCV RBC AUTO: 94.1 FL (ref 80–100)
MONOCYTES NFR BLD AUTO: 8.2 % (ref 1.7–9.3)
MUCOUS (RMG): NORMAL
NITRITE (RMG): NEGATIVE
PH UR STRIP: 6 PH (ref 5–9)
PLATELET # BLD AUTO: 194 K/UL (ref 140–450)
PROTEIN (RMG): NEGATIVE
RBC # BLD AUTO: 4.68 X10/CMM (ref 4.2–5.9)
RBC (RMG): NORMAL
SP GR UR STRIP: 1.01 (ref 1–1.02)
UROBILINOGEN (RMG): 0.2
WBC # BLD AUTO: 5.4 X10/CMM (ref 3.8–11)
WBC (RMG): NORMAL

## 2023-06-08 PROCEDURE — 81003 URINALYSIS AUTO W/O SCOPE: CPT | Mod: QW | Performed by: FAMILY MEDICINE

## 2023-06-08 PROCEDURE — 99214 OFFICE O/P EST MOD 30 MIN: CPT | Performed by: FAMILY MEDICINE

## 2023-06-08 PROCEDURE — 36415 COLL VENOUS BLD VENIPUNCTURE: CPT | Performed by: FAMILY MEDICINE

## 2023-06-08 PROCEDURE — 85025 COMPLETE CBC W/AUTO DIFF WBC: CPT | Performed by: FAMILY MEDICINE

## 2023-06-08 NOTE — PROGRESS NOTES
"SUBJECTIVE:    Montrell Jaramillo, is a 32 year old male, with recent h/o (Jan 2023) of small bowel obstruction, presenting for the below:     1. 1 year h/o of lower left back / flank pain. 6-7/ 10 in severity. Dull. Constant. Fluctuates. Worse in the morning, eases through the day. Working with chiropractor, PT, massage and  with no improvement. Denies radiation to groin. No urinary symptoms.  No fever, chills ,nausea. Some reduction in appetite. Eased by traction. Some loose stool in the last few weeks.     OBJECTIVE:  Vitals:    06/08/23 0917   BP: 129/89   Pulse: 60   SpO2: 98%   Weight: 92.9 kg (204 lb 12.8 oz)   Height: 1.861 m (6' 1.25\")    Body mass index is 26.84 kg/m .    General: no acute distress, cooperative with exam.  Eyes: no injection or drainage.  Ears: TM's and canals show no erythema, discharge, or effusion bilaterally.  Throat: moist mucous membranes, no tonsillar exudate or hypertrophy, posterior oral pharynx non-erythematous without lesions.  Neck: supple, no thyroid nodules or enlargement.  Lungs: clear to auscultation bilaterally, normal respiratory effort.  Heart: regular rate, normal S1 and S2, no murmurs.   Abdomen: normal bowel sounds, tender in left flank area, no rebound or guarding,no palpable organomegaly.  Extremities: warm, perfused, no swelling or edema.  Neuro: grossly intact   Psych: mental status normal, mood and affect appropriate.    U/A : unremarkable.     ASSESSMENT / PLAN:      Left flank pain, chronic  H/O small bowel obstruction  Patient feels confident that this is not muscular in origin.  Differential includes adhesions from prior SBO, nephrolithiasis.   Work up as below including CT abdo / pelvis with significant SBO history.    Will call patient with results.   -     [place]  -     Comp. Metabolic Panel (14) ([facility])  -     [place] with Diff/Plt [place]  -     Radiology Referral; Future  -     VENOUS COLLECTION            "

## 2023-06-09 LAB
ALBUMIN SERPL-MCNC: 5 G/DL (ref 4–5)
ALBUMIN/GLOB SERPL: 2.5 {RATIO} (ref 1.2–2.2)
ALP SERPL-CCNC: 56 IU/L (ref 44–121)
ALT SERPL-CCNC: 19 IU/L (ref 0–44)
AST SERPL-CCNC: 15 IU/L (ref 0–40)
BILIRUB SERPL-MCNC: 0.8 MG/DL (ref 0–1.2)
BUN SERPL-MCNC: 22 MG/DL (ref 6–20)
BUN/CREATININE RATIO: 22 (ref 9–20)
CALCIUM SERPL-MCNC: 9.4 MG/DL (ref 8.7–10.2)
CHLORIDE SERPLBLD-SCNC: 101 MMOL/L (ref 96–106)
CREAT SERPL-MCNC: 1.01 MG/DL (ref 0.76–1.27)
EGFR: 101 ML/MIN/1.73
GLOBULIN, TOTAL: 2 G/DL (ref 1.5–4.5)
GLUCOSE SERPL-MCNC: 95 MG/DL (ref 70–99)
POTASSIUM SERPL-SCNC: 4.6 MMOL/L (ref 3.5–5.2)
PROT SERPL-MCNC: 7 G/DL (ref 6–8.5)
SODIUM SERPL-SCNC: 140 MMOL/L (ref 134–144)
TOTAL CO2: 25 MMOL/L (ref 20–29)

## 2023-06-15 ENCOUNTER — TRANSFERRED RECORDS (OUTPATIENT)
Dept: FAMILY MEDICINE | Facility: CLINIC | Age: 33
End: 2023-06-15

## 2023-06-15 DIAGNOSIS — R93.429 ABNORMAL CT SCAN, KIDNEY: Primary | ICD-10-CM

## 2023-06-27 ENCOUNTER — TELEPHONE (OUTPATIENT)
Dept: SURGERY | Facility: CLINIC | Age: 33
End: 2023-06-27
Payer: COMMERCIAL

## 2023-06-27 DIAGNOSIS — K56.609 SBO (SMALL BOWEL OBSTRUCTION) (H): Primary | ICD-10-CM

## 2023-06-27 NOTE — TELEPHONE ENCOUNTER
Orders received for diagnostic laparoscopy with Dr. Mason De Jesus.       Left message for patient to call me at their convenience to schedule surgery.     Charlotte ABRAHAM    Surgery Coordinator  [facility]  [facility]  [phone]

## 2023-08-25 ENCOUNTER — APPOINTMENT (OUTPATIENT)
Dept: URBAN - METROPOLITAN AREA CLINIC 259 | Age: 33
Setting detail: DERMATOLOGY
End: 2023-09-04

## 2023-08-25 DIAGNOSIS — L663 OTHER SPECIFIED DISEASES OF HAIR AND HAIR FOLLICLES: ICD-10-CM

## 2023-08-25 DIAGNOSIS — L20.89 OTHER ATOPIC DERMATITIS: ICD-10-CM

## 2023-08-25 DIAGNOSIS — L73.8 OTHER SPECIFIED FOLLICULAR DISORDERS: ICD-10-CM

## 2023-08-25 PROBLEM — L02.222 FURUNCLE OF BACK [ANY PART, EXCEPT BUTTOCK]: Status: ACTIVE | Noted: 2023-08-25

## 2023-08-25 PROCEDURE — 99213 OFFICE O/P EST LOW 20 MIN: CPT | Mod: 25

## 2023-08-25 PROCEDURE — OTHER ELECTRODESICCATION: OTHER

## 2023-08-25 PROCEDURE — OTHER PRESCRIPTION: OTHER

## 2023-08-25 PROCEDURE — 17110 DESTRUCT B9 LESION 1-14: CPT

## 2023-08-25 PROCEDURE — OTHER PRESCRIPTION MEDICATION MANAGEMENT: OTHER

## 2023-08-25 PROCEDURE — OTHER MIPS QUALITY: OTHER

## 2023-08-25 PROCEDURE — OTHER COUNSELING: OTHER

## 2023-08-25 RX ORDER — CEPHALEXIN 500 MG/1
CAPSULE ORAL
Qty: 60 | Refills: 1 | Status: ERX | COMMUNITY
Start: 2023-08-25

## 2023-08-25 RX ORDER — FLUCONAZOLE 200 MG/1
TABLET ORAL
Qty: 56 | Refills: 0 | Status: ERX | COMMUNITY
Start: 2023-08-25

## 2023-08-25 ASSESSMENT — LOCATION DETAILED DESCRIPTION DERM
LOCATION DETAILED: RIGHT MEDIAL UPPER BACK
LOCATION DETAILED: RIGHT CENTRAL MALAR CHEEK
LOCATION DETAILED: INFERIOR MID FOREHEAD
LOCATION DETAILED: INFERIOR THORACIC SPINE
LOCATION DETAILED: LEFT CENTRAL MALAR CHEEK
LOCATION DETAILED: LEFT INFERIOR CENTRAL MALAR CHEEK
LOCATION DETAILED: RIGHT INFERIOR CENTRAL MALAR CHEEK

## 2023-08-25 ASSESSMENT — LOCATION SIMPLE DESCRIPTION DERM
LOCATION SIMPLE: UPPER BACK
LOCATION SIMPLE: INFERIOR FOREHEAD
LOCATION SIMPLE: RIGHT CHEEK
LOCATION SIMPLE: RIGHT UPPER BACK
LOCATION SIMPLE: LEFT CHEEK

## 2023-08-25 ASSESSMENT — LOCATION ZONE DERM
LOCATION ZONE: TRUNK
LOCATION ZONE: FACE

## 2023-08-25 NOTE — PROCEDURE: ELECTRODESICCATION
Post-Care Instructions: I reviewed with the patient in detail post-care instructions. Patient is to wear sunprotection, and avoid picking at any of the treated lesions. Pt may apply Vaseline to crusted or scabbing areas
Include Z78.9 (Other Specified Conditions Influencing Health Status) As An Associated Diagnosis?: No
Medical Necessity Clause: This procedure was medically necessary because the lesions that were treated were:
Consent: The patient's consent was obtained including but not limited to risks of crusting, scabbing, blistering, scarring, darker or lighter pigmentary change, recurrence, incomplete removal and infection.
Detail Level: Simple
Medical Necessity Information: It is in your best interest to select a reason for this procedure from the list below. All of these items fulfill various CMS LCD requirements except the new and changing color options.
Anesthesia Type: 1% lidocaine with epinephrine

## 2023-11-06 ENCOUNTER — MYC MEDICAL ADVICE (OUTPATIENT)
Dept: FAMILY MEDICINE | Facility: CLINIC | Age: 33
End: 2023-11-06

## 2023-11-07 NOTE — TELEPHONE ENCOUNTER
See patient's MyChart message.   Called patient and discussed the current antibiotics are different classes and unrelated to Bactrim. Patient reports is taking the levofloxacin and cephalexin as prescribed and tolerating well. Will continue and call if problems.     Regarding reported allergy to Bactrim, patient states med was prescribed by derm for folliculitis. Was taking and tolerating for a few months then 1/2023 was admitted to hospital with nausea, vomiting, abd pain and dx SBO. Bactrim was held in hospital.   Reports when discharged and was well, he started med again and had nausea vomiting abd pain. Stopped med and resolved. Restarted and symptoms returned. Derm advised discontinue med.   Added this med today to his allergy list.

## 2024-07-28 ENCOUNTER — HEALTH MAINTENANCE LETTER (OUTPATIENT)
Age: 34
End: 2024-07-28

## 2025-03-28 ENCOUNTER — APPOINTMENT (OUTPATIENT)
Dept: URBAN - METROPOLITAN AREA CLINIC 259 | Age: 35
Setting detail: DERMATOLOGY
End: 2025-03-31

## 2025-03-28 DIAGNOSIS — L73.8 OTHER SPECIFIED FOLLICULAR DISORDERS: ICD-10-CM

## 2025-03-28 DIAGNOSIS — L57.8 OTHER SKIN CHANGES DUE TO CHRONIC EXPOSURE TO NONIONIZING RADIATION: ICD-10-CM

## 2025-03-28 PROCEDURE — OTHER ADDITIONAL NOTES: OTHER

## 2025-03-28 PROCEDURE — OTHER COUNSELING: OTHER

## 2025-03-28 PROCEDURE — 99213 OFFICE O/P EST LOW 20 MIN: CPT

## 2025-03-28 PROCEDURE — OTHER MIPS QUALITY: OTHER

## 2025-03-28 ASSESSMENT — LOCATION SIMPLE DESCRIPTION DERM: LOCATION SIMPLE: LEFT CHEEK

## 2025-03-28 ASSESSMENT — LOCATION ZONE DERM: LOCATION ZONE: FACE

## 2025-03-28 ASSESSMENT — LOCATION DETAILED DESCRIPTION DERM: LOCATION DETAILED: LEFT CENTRAL MALAR CHEEK

## 2025-03-28 NOTE — PROCEDURE: ADDITIONAL NOTES
Render Risk Assessment In Note?: no
Detail Level: Simple
Additional Notes: The patient's consent was obtained including but not limited to risks of crusting, scabbing, blistering, scarring, darker or lighter pigmentary change, recurrence, incomplete removal and infection. The treatment areas were anesthetized with 1% lidocaine with epinephrine. A total of 11 lesions were treated with light electrodesiccation located on the nose, right forehead, left cheek, left temple, left forehead, and right cheek using 2 penn.

## 2025-08-10 ENCOUNTER — HEALTH MAINTENANCE LETTER (OUTPATIENT)
Age: 35
End: 2025-08-10